# Patient Record
Sex: FEMALE | Race: WHITE | NOT HISPANIC OR LATINO
[De-identification: names, ages, dates, MRNs, and addresses within clinical notes are randomized per-mention and may not be internally consistent; named-entity substitution may affect disease eponyms.]

---

## 2020-09-14 ENCOUNTER — FORM ENCOUNTER (OUTPATIENT)
Age: 35
End: 2020-09-14

## 2020-10-07 ENCOUNTER — FORM ENCOUNTER (OUTPATIENT)
Age: 35
End: 2020-10-07

## 2020-10-11 ENCOUNTER — FORM ENCOUNTER (OUTPATIENT)
Age: 35
End: 2020-10-11

## 2020-10-12 ENCOUNTER — FORM ENCOUNTER (OUTPATIENT)
Age: 35
End: 2020-10-12

## 2020-10-12 ENCOUNTER — ASOB RESULT (OUTPATIENT)
Age: 35
End: 2020-10-12

## 2020-10-12 ENCOUNTER — OUTPATIENT (OUTPATIENT)
Dept: OUTPATIENT SERVICES | Facility: HOSPITAL | Age: 35
LOS: 1 days | Discharge: HOME | End: 2020-10-12

## 2020-10-12 ENCOUNTER — APPOINTMENT (OUTPATIENT)
Dept: ANTEPARTUM | Facility: CLINIC | Age: 35
End: 2020-10-12
Payer: COMMERCIAL

## 2020-10-12 DIAGNOSIS — Z11.59 ENCOUNTER FOR SCREENING FOR OTHER VIRAL DISEASES: ICD-10-CM

## 2020-10-12 PROCEDURE — 76817 TRANSVAGINAL US OBSTETRIC: CPT

## 2020-10-12 PROCEDURE — 76801 OB US < 14 WKS SINGLE FETUS: CPT

## 2020-10-13 ENCOUNTER — FORM ENCOUNTER (OUTPATIENT)
Age: 35
End: 2020-10-13

## 2020-10-14 ENCOUNTER — RESULT REVIEW (OUTPATIENT)
Age: 35
End: 2020-10-14

## 2020-10-14 ENCOUNTER — OUTPATIENT (OUTPATIENT)
Dept: OUTPATIENT SERVICES | Facility: HOSPITAL | Age: 35
LOS: 1 days | Discharge: HOME | End: 2020-10-14
Payer: COMMERCIAL

## 2020-10-14 VITALS
RESPIRATION RATE: 18 BRPM | DIASTOLIC BLOOD PRESSURE: 60 MMHG | HEIGHT: 66 IN | TEMPERATURE: 98 F | OXYGEN SATURATION: 100 % | SYSTOLIC BLOOD PRESSURE: 106 MMHG | WEIGHT: 147.05 LBS | HEART RATE: 79 BPM

## 2020-10-14 VITALS
RESPIRATION RATE: 24 BRPM | OXYGEN SATURATION: 99 % | SYSTOLIC BLOOD PRESSURE: 99 MMHG | DIASTOLIC BLOOD PRESSURE: 55 MMHG | HEART RATE: 68 BPM

## 2020-10-14 DIAGNOSIS — Z98.890 OTHER SPECIFIED POSTPROCEDURAL STATES: Chronic | ICD-10-CM

## 2020-10-14 PROCEDURE — 88305 TISSUE EXAM BY PATHOLOGIST: CPT | Mod: 26

## 2020-10-14 PROCEDURE — 59820 CARE OF MISCARRIAGE: CPT

## 2020-10-14 RX ORDER — OXYCODONE AND ACETAMINOPHEN 5; 325 MG/1; MG/1
1 TABLET ORAL EVERY 4 HOURS
Refills: 0 | Status: DISCONTINUED | OUTPATIENT
Start: 2020-10-14 | End: 2020-10-14

## 2020-10-14 RX ORDER — ONDANSETRON 8 MG/1
4 TABLET, FILM COATED ORAL ONCE
Refills: 0 | Status: DISCONTINUED | OUTPATIENT
Start: 2020-10-14 | End: 2020-10-28

## 2020-10-14 RX ORDER — SODIUM CHLORIDE 9 MG/ML
1000 INJECTION, SOLUTION INTRAVENOUS
Refills: 0 | Status: DISCONTINUED | OUTPATIENT
Start: 2020-10-14 | End: 2020-10-28

## 2020-10-14 RX ORDER — HYDROMORPHONE HYDROCHLORIDE 2 MG/ML
0.5 INJECTION INTRAMUSCULAR; INTRAVENOUS; SUBCUTANEOUS
Refills: 0 | Status: DISCONTINUED | OUTPATIENT
Start: 2020-10-14 | End: 2020-10-14

## 2020-10-14 RX ADMIN — SODIUM CHLORIDE 100 MILLILITER(S): 9 INJECTION, SOLUTION INTRAVENOUS at 14:44

## 2020-10-14 NOTE — CHART NOTE - NSCHARTNOTEFT_GEN_A_CORE
PACU ANESTHESIA ADMISSION NOTE      Procedure: Dilation and curettage of uterus using suction for missed  in first trimester      Post op diagnosis:  Missed         ____  Intubated  TV:______       Rate: ______      FiO2: ______    _x___  Patent Airway    _x___  Full return of protective reflexes    _x___  Full recovery from anesthesia / back to baseline status    Vitals  SPO2:-99% on 2l nc  HR:-78  RR:-12  B.P:-116/56  TEMP:-97.8    Mental Status:  _x___ Awake   ___x_ Alert   _____ Drowsy   _____ Sedated    Nausea/Vomiting:  _x___  NO       ______Yes,   See Post - Op Orders         Pain Scale (0-10):  __0___    Treatment: _x___ None    __x__ See Post - Op/PCA Orders    Post - Operative Fluids:   ___ Oral   ____x See Post - Op Orders    Plan: Discharge:   ___x_Home       _____Floor     _____Critical Care    _____  Other:_________________    Comments:  Report endorsed to RN in pacu  Vitals stable  No anesthesia issues or complications noted.  Discharge to patient to  home when criteria met.

## 2020-10-14 NOTE — H&P PST ADULT - ASSESSMENT
34yo  @10w2d here for D&C for MAB, measuring 6.5w by sono without a FHR.  - pre-op prep  - IV Fluids     aware

## 2020-10-14 NOTE — ASU DISCHARGE PLAN (ADULT/PEDIATRIC) - CARE PROVIDER_API CALL
Gallo Mendoza  OBSTETRICS AND GYNECOLOGY  [address] [place] [address]  Phone: [phone]  Fax: [phone]  Follow Up Time: 2 weeks

## 2020-10-14 NOTE — BRIEF OPERATIVE NOTE - OPERATION/FINDINGS
EUA revealed an anteverted 8 week size uterus with some vaginal bleeding. Moderate amounts of tissue obtained on curettage.

## 2020-10-14 NOTE — H&P PST ADULT - HISTORY OF PRESENT ILLNESS
36yo  @10w2d by LMP here for D&C for MAB. Patient was experiencing brown discharge last week @9w and she was measuring 6.5w. Sonogram was done to confirm findings 2 days ago and no fetal heart rate was found. Patient currently experiencing lower abdominal pain and lower back pain that goes on/off and is 6-7/10 in intensity. Currently having vaginal bleeding. Denies dizziness, lightheadedness, SOB, palpitations. Denies fever, chills, nausea, vomiting. Rh positive. Last Hgb 12.7 (19).

## 2020-10-14 NOTE — BRIEF OPERATIVE NOTE - NSICDXBRIEFPROCEDURE_GEN_ALL_CORE_FT
PROCEDURES:  Dilation and curettage of uterus using suction for missed  in first trimester 14-Oct-2020 14:32:54  Denisse Alvares

## 2020-10-15 LAB — SURGICAL PATHOLOGY STUDY: SIGNIFICANT CHANGE UP

## 2020-10-16 DIAGNOSIS — Z88.1 ALLERGY STATUS TO OTHER ANTIBIOTIC AGENTS STATUS: ICD-10-CM

## 2020-10-16 DIAGNOSIS — O02.1 MISSED ABORTION: ICD-10-CM

## 2020-10-16 DIAGNOSIS — Z88.6 ALLERGY STATUS TO ANALGESIC AGENT: ICD-10-CM

## 2020-10-16 DIAGNOSIS — K58.9 IRRITABLE BOWEL SYNDROME WITHOUT DIARRHEA: ICD-10-CM

## 2020-10-25 ENCOUNTER — FORM ENCOUNTER (OUTPATIENT)
Age: 35
End: 2020-10-25

## 2021-06-09 ENCOUNTER — FORM ENCOUNTER (OUTPATIENT)
Age: 36
End: 2021-06-09

## 2021-06-16 ENCOUNTER — FORM ENCOUNTER (OUTPATIENT)
Age: 36
End: 2021-06-16

## 2021-06-24 ENCOUNTER — FORM ENCOUNTER (OUTPATIENT)
Age: 36
End: 2021-06-24

## 2021-08-25 ENCOUNTER — FORM ENCOUNTER (OUTPATIENT)
Age: 36
End: 2021-08-25

## 2021-09-08 ENCOUNTER — FORM ENCOUNTER (OUTPATIENT)
Age: 36
End: 2021-09-08

## 2021-10-06 ENCOUNTER — FORM ENCOUNTER (OUTPATIENT)
Age: 36
End: 2021-10-06

## 2021-11-01 ENCOUNTER — FORM ENCOUNTER (OUTPATIENT)
Age: 36
End: 2021-11-01

## 2021-11-02 PROBLEM — K58.9 IRRITABLE BOWEL SYNDROME WITHOUT DIARRHEA: Chronic | Status: ACTIVE | Noted: 2020-10-14

## 2021-12-07 ENCOUNTER — FORM ENCOUNTER (OUTPATIENT)
Age: 36
End: 2021-12-07

## 2021-12-08 ENCOUNTER — ASOB RESULT (OUTPATIENT)
Age: 36
End: 2021-12-08

## 2021-12-08 ENCOUNTER — APPOINTMENT (OUTPATIENT)
Dept: ANTEPARTUM | Facility: CLINIC | Age: 36
End: 2021-12-08
Payer: COMMERCIAL

## 2021-12-08 PROCEDURE — 76811 OB US DETAILED SNGL FETUS: CPT

## 2021-12-13 ENCOUNTER — OUTPATIENT (OUTPATIENT)
Dept: OUTPATIENT SERVICES | Facility: HOSPITAL | Age: 36
LOS: 1 days | End: 2021-12-13
Payer: COMMERCIAL

## 2021-12-13 DIAGNOSIS — Z20.828 CONTACT WITH AND (SUSPECTED) EXPOSURE TO OTHER VIRAL COMMUNICABLE DISEASES: ICD-10-CM

## 2021-12-13 DIAGNOSIS — Z98.890 OTHER SPECIFIED POSTPROCEDURAL STATES: Chronic | ICD-10-CM

## 2021-12-13 LAB — SARS-COV-2 RNA SPEC QL NAA+PROBE: SIGNIFICANT CHANGE UP

## 2021-12-13 PROCEDURE — 87635 SARS-COV-2 COVID-19 AMP PRB: CPT

## 2022-01-03 ENCOUNTER — FORM ENCOUNTER (OUTPATIENT)
Age: 37
End: 2022-01-03

## 2022-01-10 ENCOUNTER — FORM ENCOUNTER (OUTPATIENT)
Age: 37
End: 2022-01-10

## 2022-01-12 ENCOUNTER — OUTPATIENT (OUTPATIENT)
Dept: OUTPATIENT SERVICES | Facility: HOSPITAL | Age: 37
LOS: 1 days | Discharge: HOME | End: 2022-01-12

## 2022-01-12 VITALS
SYSTOLIC BLOOD PRESSURE: 107 MMHG | RESPIRATION RATE: 18 BRPM | TEMPERATURE: 98 F | DIASTOLIC BLOOD PRESSURE: 60 MMHG | HEART RATE: 87 BPM

## 2022-01-12 VITALS — SYSTOLIC BLOOD PRESSURE: 116 MMHG | DIASTOLIC BLOOD PRESSURE: 63 MMHG | HEART RATE: 83 BPM

## 2022-01-12 DIAGNOSIS — Z98.890 OTHER SPECIFIED POSTPROCEDURAL STATES: Chronic | ICD-10-CM

## 2022-01-12 LAB
APTT BLD: 30 SEC — SIGNIFICANT CHANGE UP (ref 27–39.2)
APTT BLD: 30.2 SEC — SIGNIFICANT CHANGE UP (ref 27–39.2)
BASOPHILS # BLD AUTO: 0.02 K/UL — SIGNIFICANT CHANGE UP (ref 0–0.2)
BASOPHILS # BLD AUTO: 0.03 K/UL — SIGNIFICANT CHANGE UP (ref 0–0.2)
BASOPHILS NFR BLD AUTO: 0.2 % — SIGNIFICANT CHANGE UP (ref 0–1)
BASOPHILS NFR BLD AUTO: 0.3 % — SIGNIFICANT CHANGE UP (ref 0–1)
BLD GP AB SCN SERPL QL: SIGNIFICANT CHANGE UP
EOSINOPHIL # BLD AUTO: 0.11 K/UL — SIGNIFICANT CHANGE UP (ref 0–0.7)
EOSINOPHIL # BLD AUTO: 0.2 K/UL — SIGNIFICANT CHANGE UP (ref 0–0.7)
EOSINOPHIL NFR BLD AUTO: 1.1 % — SIGNIFICANT CHANGE UP (ref 0–8)
EOSINOPHIL NFR BLD AUTO: 2.3 % — SIGNIFICANT CHANGE UP (ref 0–8)
FIBRINOGEN PPP-MCNC: 445 MG/DL — SIGNIFICANT CHANGE UP (ref 204.4–570.6)
FIBRINOGEN PPP-MCNC: 491 MG/DL — SIGNIFICANT CHANGE UP (ref 204.4–570.6)
HCT VFR BLD CALC: 33.1 % — LOW (ref 37–47)
HCT VFR BLD CALC: 35.5 % — LOW (ref 37–47)
HGB BLD-MCNC: 11.5 G/DL — LOW (ref 12–16)
HGB BLD-MCNC: 12.2 G/DL — SIGNIFICANT CHANGE UP (ref 12–16)
IMM GRANULOCYTES NFR BLD AUTO: 1.2 % — HIGH (ref 0.1–0.3)
IMM GRANULOCYTES NFR BLD AUTO: 1.2 % — HIGH (ref 0.1–0.3)
INR BLD: 0.95 RATIO — SIGNIFICANT CHANGE UP (ref 0.65–1.3)
INR BLD: 0.98 RATIO — SIGNIFICANT CHANGE UP (ref 0.65–1.3)
LYMPHOCYTES # BLD AUTO: 1.38 K/UL — SIGNIFICANT CHANGE UP (ref 1.2–3.4)
LYMPHOCYTES # BLD AUTO: 1.86 K/UL — SIGNIFICANT CHANGE UP (ref 1.2–3.4)
LYMPHOCYTES # BLD AUTO: 14.2 % — LOW (ref 20.5–51.1)
LYMPHOCYTES # BLD AUTO: 21.5 % — SIGNIFICANT CHANGE UP (ref 20.5–51.1)
MCHC RBC-ENTMCNC: 32.8 PG — HIGH (ref 27–31)
MCHC RBC-ENTMCNC: 33.2 PG — HIGH (ref 27–31)
MCHC RBC-ENTMCNC: 34.4 G/DL — SIGNIFICANT CHANGE UP (ref 32–37)
MCHC RBC-ENTMCNC: 34.7 G/DL — SIGNIFICANT CHANGE UP (ref 32–37)
MCV RBC AUTO: 95.4 FL — SIGNIFICANT CHANGE UP (ref 81–99)
MCV RBC AUTO: 95.7 FL — SIGNIFICANT CHANGE UP (ref 81–99)
MONOCYTES # BLD AUTO: 0.8 K/UL — HIGH (ref 0.1–0.6)
MONOCYTES # BLD AUTO: 0.81 K/UL — HIGH (ref 0.1–0.6)
MONOCYTES NFR BLD AUTO: 8.2 % — SIGNIFICANT CHANGE UP (ref 1.7–9.3)
MONOCYTES NFR BLD AUTO: 9.3 % — SIGNIFICANT CHANGE UP (ref 1.7–9.3)
NEUTROPHILS # BLD AUTO: 5.67 K/UL — SIGNIFICANT CHANGE UP (ref 1.4–6.5)
NEUTROPHILS # BLD AUTO: 7.29 K/UL — HIGH (ref 1.4–6.5)
NEUTROPHILS NFR BLD AUTO: 65.4 % — SIGNIFICANT CHANGE UP (ref 42.2–75.2)
NEUTROPHILS NFR BLD AUTO: 75.1 % — SIGNIFICANT CHANGE UP (ref 42.2–75.2)
NRBC # BLD: 0 /100 WBCS — SIGNIFICANT CHANGE UP (ref 0–0)
NRBC # BLD: 0 /100 WBCS — SIGNIFICANT CHANGE UP (ref 0–0)
PLATELET # BLD AUTO: 244 K/UL — SIGNIFICANT CHANGE UP (ref 130–400)
PLATELET # BLD AUTO: 272 K/UL — SIGNIFICANT CHANGE UP (ref 130–400)
PROTHROM AB SERPL-ACNC: 10.9 SEC — SIGNIFICANT CHANGE UP (ref 9.95–12.87)
PROTHROM AB SERPL-ACNC: 11.3 SEC — SIGNIFICANT CHANGE UP (ref 9.95–12.87)
RBC # BLD: 3.46 M/UL — LOW (ref 4.2–5.4)
RBC # BLD: 3.72 M/UL — LOW (ref 4.2–5.4)
RBC # FLD: 13.2 % — SIGNIFICANT CHANGE UP (ref 11.5–14.5)
RBC # FLD: 13.2 % — SIGNIFICANT CHANGE UP (ref 11.5–14.5)
SARS-COV-2 RNA SPEC QL NAA+PROBE: DETECTED
WBC # BLD: 8.67 K/UL — SIGNIFICANT CHANGE UP (ref 4.8–10.8)
WBC # BLD: 9.72 K/UL — SIGNIFICANT CHANGE UP (ref 4.8–10.8)
WBC # FLD AUTO: 8.67 K/UL — SIGNIFICANT CHANGE UP (ref 4.8–10.8)
WBC # FLD AUTO: 9.72 K/UL — SIGNIFICANT CHANGE UP (ref 4.8–10.8)

## 2022-01-12 RX ORDER — SODIUM CHLORIDE 9 MG/ML
1000 INJECTION, SOLUTION INTRAVENOUS
Refills: 0 | Status: DISCONTINUED | OUTPATIENT
Start: 2022-01-12 | End: 2022-01-26

## 2022-01-12 RX ADMIN — SODIUM CHLORIDE 125 MILLILITER(S): 9 INJECTION, SOLUTION INTRAVENOUS at 17:35

## 2022-01-12 NOTE — OB RN TRIAGE NOTE - FALL HARM RISK - UNIVERSAL INTERVENTIONS
Bed in lowest position, wheels locked, appropriate side rails in place/Call bell, personal items and telephone in reach/Instruct patient to call for assistance before getting out of bed or chair/Non-slip footwear when patient is out of bed/[place] to call system/Physically safe environment - no spills, clutter or unnecessary equipment/Purposeful Proactive Rounding/Room/bathroom lighting operational, light cord in reach

## 2022-01-12 NOTE — OB PROVIDER TRIAGE NOTE - ADDITIONAL INSTRUCTIONS
Please return to hospital if you experience decreased fetal movement, heavy vaginal bleeding, frequent and persistent contractions, or leakage of fluid. Please keep your appointment with your doctor.

## 2022-01-12 NOTE — OB PROVIDER TRIAGE NOTE - HISTORY OF PRESENT ILLNESS
35yo  at 25w6d GA, EDC 22 by LMP c/w 1st trimester sonogram presenting to L&D for vaginal bleeding starting this morning at 730AM. Patient states she woke up and noticed about a quarter size ring of blood on her underwear. Patient states the bleeding has now stopped. Patient denies recent intercourse, abdominal trauma or vaginal trauma. Pregnancy complicated early on by placenta previa that resolved and is now low lying. Patient denies abdominal pain, contractions, LOF. Good fetal movement. No other complications this pregnancy. GBS unknown    Last BM this morning  Last sex 2 weeks ago  Last PO intake 1030 this morning

## 2022-01-12 NOTE — OB PROVIDER TRIAGE NOTE - NSHPPHYSICALEXAM_GEN_ALL_CORE
T(C): 36.7 (01-12-22 @ 11:35), Max: 36.7 (01-12-22 @ 11:35)  HR: 83 (01-12-22 @ 11:35) (83 - 83)  BP: 116/63 (01-12-22 @ 11:35) (116/63 - 116/63)  RR: 18 (01-12-22 @ 11:35) (18 - 18)    Gen: A+OX3. NAD  Heart: RRR, no M/R/G  Lungs: CTAB  Abd: Soft, Nontender. Gravid. no RUQ or epigastric pain  LE: no erythema, edema or pain    FHR: 150bpm/moderate variability/+accelerations/no decelerations  TOCO: none  SVE: deferred  speculum: cervix visually closed, no vaginal bleeding noted, no abnormal discharge  TVUS: cvx 4.28cm, placenta 0.95cm away from cervical os  BSS: breech, posterior placenta, MVP: 5.65cm, BPP: 10/10    EFW by [place]: 1000gms T(C): 36.7 (01-12-22 @ 11:35), Max: 36.7 (01-12-22 @ 11:35)  HR: 83 (01-12-22 @ 11:35) (83 - 83)  BP: 116/63 (01-12-22 @ 11:35) (116/63 - 116/63)  RR: 18 (01-12-22 @ 11:35) (18 - 18)    Gen: A+OX3. NAD  Heart: RRR, no M/R/G  Lungs: CTAB  Abd: Soft, Nontender. Gravid. no RUQ or epigastric pain  LE: no erythema, edema or pain    FHR: 150bpm/moderate variability/+accelerations/no decelerations  TOCO: none  SVE: deferred  speculum: cervix visually closed, no vaginal bleeding noted, no abnormal discharge  TVUS: cvx 4.28cm, placenta 0.95cm away from cervical os  BSS: breech, posterior placenta, MVP: 5.65cm, BPP: 10/10, 2lbs 8oz (79%)

## 2022-01-12 NOTE — OB PROVIDER TRIAGE NOTE - NS_OBGYNHISTORY_OBGYN_ALL_OB_FT
OB Hx:     FT NSVDx4 largest 8lbs  SAB w/ D+C    Gyn Hx: denies abnormal papsmears, fibroids, cysts, STDs

## 2022-01-12 NOTE — OB PROVIDER TRIAGE NOTE - NSOBPROVIDERNOTE_OBGYN_ALL_OB_FT
37yo  at 25w6d GA, GBS unknown, with vaginal bleeding now resolved, reassuring maternal and fetal status, for prolonged monitoring  -continuous eFM and toco  -vitals and labs  -pad counts  -f/u covid swab    Dr. Mendoza and Dr. Cheek to be made aware 35yo  at 25w6d GA, GBS unknown, with vaginal bleeding now resolved, reassuring maternal and fetal status, for prolonged monitoring  -continuous eFM and toco  -vitals and labs  -pad counts  -f/u covid swab    Dr. Mendoza and Dr. Cheek to be made aware    Append:   35yo  at 25w6d GA, Rh positive, GBS unknown, with vaginal bleeding now resolved, reassuring maternal and fetal status  -repeat labs after monitoring are stable  - labor precautions given  -fetal kick count instructions counseled  -PO maternal hydration encouraged  -follow up in next scheduled office appointemt    Dr Tilley and Dr Mendoza aware

## 2022-02-06 ENCOUNTER — FORM ENCOUNTER (OUTPATIENT)
Age: 37
End: 2022-02-06

## 2022-02-07 ENCOUNTER — ASOB RESULT (OUTPATIENT)
Age: 37
End: 2022-02-07

## 2022-02-07 ENCOUNTER — APPOINTMENT (OUTPATIENT)
Dept: ANTEPARTUM | Facility: CLINIC | Age: 37
End: 2022-02-07
Payer: COMMERCIAL

## 2022-02-07 PROCEDURE — 76816 OB US FOLLOW-UP PER FETUS: CPT

## 2022-02-07 PROCEDURE — 76817 TRANSVAGINAL US OBSTETRIC: CPT

## 2022-02-22 ENCOUNTER — FORM ENCOUNTER (OUTPATIENT)
Age: 37
End: 2022-02-22

## 2022-02-27 ENCOUNTER — FORM ENCOUNTER (OUTPATIENT)
Age: 37
End: 2022-02-27

## 2022-03-20 ENCOUNTER — FORM ENCOUNTER (OUTPATIENT)
Age: 37
End: 2022-03-20

## 2022-03-21 ENCOUNTER — ASOB RESULT (OUTPATIENT)
Age: 37
End: 2022-03-21

## 2022-03-21 ENCOUNTER — APPOINTMENT (OUTPATIENT)
Dept: ANTEPARTUM | Facility: CLINIC | Age: 37
End: 2022-03-21
Payer: COMMERCIAL

## 2022-03-21 PROCEDURE — 76819 FETAL BIOPHYS PROFIL W/O NST: CPT

## 2022-03-21 PROCEDURE — 76817 TRANSVAGINAL US OBSTETRIC: CPT

## 2022-03-21 PROCEDURE — 76816 OB US FOLLOW-UP PER FETUS: CPT

## 2022-03-23 ENCOUNTER — APPOINTMENT (OUTPATIENT)
Dept: ANTEPARTUM | Facility: CLINIC | Age: 37
End: 2022-03-23
Payer: COMMERCIAL

## 2022-03-23 ENCOUNTER — ASOB RESULT (OUTPATIENT)
Age: 37
End: 2022-03-23

## 2022-03-23 PROCEDURE — 76817 TRANSVAGINAL US OBSTETRIC: CPT

## 2022-03-23 PROCEDURE — 76819 FETAL BIOPHYS PROFIL W/O NST: CPT

## 2022-03-27 ENCOUNTER — FORM ENCOUNTER (OUTPATIENT)
Age: 37
End: 2022-03-27

## 2022-04-04 ENCOUNTER — APPOINTMENT (OUTPATIENT)
Dept: ANTEPARTUM | Facility: CLINIC | Age: 37
End: 2022-04-04

## 2022-04-04 ENCOUNTER — FORM ENCOUNTER (OUTPATIENT)
Age: 37
End: 2022-04-04

## 2022-04-10 ENCOUNTER — FORM ENCOUNTER (OUTPATIENT)
Age: 37
End: 2022-04-10

## 2022-04-17 ENCOUNTER — FORM ENCOUNTER (OUTPATIENT)
Age: 37
End: 2022-04-17

## 2022-04-19 ENCOUNTER — INPATIENT (INPATIENT)
Facility: HOSPITAL | Age: 37
LOS: 1 days | Discharge: HOME | End: 2022-04-21
Attending: OBSTETRICS & GYNECOLOGY | Admitting: OBSTETRICS & GYNECOLOGY
Payer: COMMERCIAL

## 2022-04-19 VITALS
HEIGHT: 66 IN | RESPIRATION RATE: 16 BRPM | HEART RATE: 67 BPM | WEIGHT: 158.07 LBS | SYSTOLIC BLOOD PRESSURE: 112 MMHG | TEMPERATURE: 99 F | DIASTOLIC BLOOD PRESSURE: 67 MMHG

## 2022-04-19 DIAGNOSIS — Z98.890 OTHER SPECIFIED POSTPROCEDURAL STATES: Chronic | ICD-10-CM

## 2022-04-19 LAB
AMPHET UR-MCNC: NEGATIVE — SIGNIFICANT CHANGE UP
APPEARANCE UR: ABNORMAL
BACTERIA # UR AUTO: ABNORMAL
BARBITURATES UR SCN-MCNC: NEGATIVE — SIGNIFICANT CHANGE UP
BASOPHILS # BLD AUTO: 0.02 K/UL — SIGNIFICANT CHANGE UP (ref 0–0.2)
BASOPHILS NFR BLD AUTO: 0.2 % — SIGNIFICANT CHANGE UP (ref 0–1)
BENZODIAZ UR-MCNC: NEGATIVE — SIGNIFICANT CHANGE UP
BILIRUB UR-MCNC: ABNORMAL
BLD GP AB SCN SERPL QL: SIGNIFICANT CHANGE UP
BUPRENORPHINE SCREEN, URINE RESULT: NEGATIVE — SIGNIFICANT CHANGE UP
COCAINE METAB.OTHER UR-MCNC: NEGATIVE — SIGNIFICANT CHANGE UP
COLOR SPEC: YELLOW — SIGNIFICANT CHANGE UP
DIFF PNL FLD: ABNORMAL
EOSINOPHIL # BLD AUTO: 0.02 K/UL — SIGNIFICANT CHANGE UP (ref 0–0.7)
EOSINOPHIL NFR BLD AUTO: 0.2 % — SIGNIFICANT CHANGE UP (ref 0–8)
EPI CELLS # UR: 3 /HPF — SIGNIFICANT CHANGE UP (ref 0–5)
FENTANYL UR QL: NEGATIVE — SIGNIFICANT CHANGE UP
GLUCOSE UR QL: NEGATIVE — SIGNIFICANT CHANGE UP
HCT VFR BLD CALC: 41.2 % — SIGNIFICANT CHANGE UP (ref 37–47)
HGB BLD-MCNC: 14.3 G/DL — SIGNIFICANT CHANGE UP (ref 12–16)
HYALINE CASTS # UR AUTO: 22 /LPF — HIGH (ref 0–7)
IMM GRANULOCYTES NFR BLD AUTO: 0.6 % — HIGH (ref 0.1–0.3)
KETONES UR-MCNC: ABNORMAL
L&D DRUG SCREEN, URINE: SIGNIFICANT CHANGE UP
LEUKOCYTE ESTERASE UR-ACNC: ABNORMAL
LYMPHOCYTES # BLD AUTO: 1.2 K/UL — SIGNIFICANT CHANGE UP (ref 1.2–3.4)
LYMPHOCYTES # BLD AUTO: 11.7 % — LOW (ref 20.5–51.1)
MCHC RBC-ENTMCNC: 32.8 PG — HIGH (ref 27–31)
MCHC RBC-ENTMCNC: 34.7 G/DL — SIGNIFICANT CHANGE UP (ref 32–37)
MCV RBC AUTO: 94.5 FL — SIGNIFICANT CHANGE UP (ref 81–99)
METHADONE UR-MCNC: NEGATIVE — SIGNIFICANT CHANGE UP
MONOCYTES # BLD AUTO: 0.72 K/UL — HIGH (ref 0.1–0.6)
MONOCYTES NFR BLD AUTO: 7 % — SIGNIFICANT CHANGE UP (ref 1.7–9.3)
NEUTROPHILS # BLD AUTO: 8.21 K/UL — HIGH (ref 1.4–6.5)
NEUTROPHILS NFR BLD AUTO: 80.3 % — HIGH (ref 42.2–75.2)
NITRITE UR-MCNC: NEGATIVE — SIGNIFICANT CHANGE UP
NRBC # BLD: 0 /100 WBCS — SIGNIFICANT CHANGE UP (ref 0–0)
OPIATES UR-MCNC: NEGATIVE — SIGNIFICANT CHANGE UP
OXYCODONE UR-MCNC: NEGATIVE — SIGNIFICANT CHANGE UP
PCP UR-MCNC: NEGATIVE — SIGNIFICANT CHANGE UP
PH UR: 7.5 — SIGNIFICANT CHANGE UP (ref 5–8)
PLATELET # BLD AUTO: 210 K/UL — SIGNIFICANT CHANGE UP (ref 130–400)
PRENATAL SYPHILIS TEST: SIGNIFICANT CHANGE UP
PROPOXYPHENE QUALITATIVE URINE RESULT: NEGATIVE — SIGNIFICANT CHANGE UP
PROT UR-MCNC: ABNORMAL
RBC # BLD: 4.36 M/UL — SIGNIFICANT CHANGE UP (ref 4.2–5.4)
RBC # FLD: 13.8 % — SIGNIFICANT CHANGE UP (ref 11.5–14.5)
RBC CASTS # UR COMP ASSIST: 7 /HPF — HIGH (ref 0–4)
SARS-COV-2 RNA SPEC QL NAA+PROBE: SIGNIFICANT CHANGE UP
SP GR SPEC: 1.02 — SIGNIFICANT CHANGE UP (ref 1.01–1.03)
UROBILINOGEN FLD QL: ABNORMAL
WBC # BLD: 10.23 K/UL — SIGNIFICANT CHANGE UP (ref 4.8–10.8)
WBC # FLD AUTO: 10.23 K/UL — SIGNIFICANT CHANGE UP (ref 4.8–10.8)
WBC UR QL: 117 /HPF — HIGH (ref 0–5)

## 2022-04-19 PROCEDURE — 59400 OBSTETRICAL CARE: CPT | Mod: U9

## 2022-04-19 RX ORDER — OXYCODONE HYDROCHLORIDE 5 MG/1
5 TABLET ORAL
Refills: 0 | Status: DISCONTINUED | OUTPATIENT
Start: 2022-04-19 | End: 2022-04-21

## 2022-04-19 RX ORDER — DIBUCAINE 1 %
1 OINTMENT (GRAM) RECTAL EVERY 6 HOURS
Refills: 0 | Status: DISCONTINUED | OUTPATIENT
Start: 2022-04-19 | End: 2022-04-21

## 2022-04-19 RX ORDER — LANOLIN
1 OINTMENT (GRAM) TOPICAL EVERY 6 HOURS
Refills: 0 | Status: DISCONTINUED | OUTPATIENT
Start: 2022-04-19 | End: 2022-04-21

## 2022-04-19 RX ORDER — OXYTOCIN 10 UNIT/ML
10 VIAL (ML) INJECTION ONCE
Refills: 0 | Status: COMPLETED | OUTPATIENT
Start: 2022-04-19 | End: 2022-04-19

## 2022-04-19 RX ORDER — KETOROLAC TROMETHAMINE 30 MG/ML
30 SYRINGE (ML) INJECTION ONCE
Refills: 0 | Status: DISCONTINUED | OUTPATIENT
Start: 2022-04-19 | End: 2022-04-21

## 2022-04-19 RX ORDER — SIMETHICONE 80 MG/1
80 TABLET, CHEWABLE ORAL EVERY 4 HOURS
Refills: 0 | Status: DISCONTINUED | OUTPATIENT
Start: 2022-04-19 | End: 2022-04-21

## 2022-04-19 RX ORDER — OXYTOCIN 10 UNIT/ML
333.33 VIAL (ML) INJECTION
Qty: 20 | Refills: 0 | Status: DISCONTINUED | OUTPATIENT
Start: 2022-04-19 | End: 2022-04-21

## 2022-04-19 RX ORDER — BENZOCAINE 10 %
1 GEL (GRAM) MUCOUS MEMBRANE EVERY 6 HOURS
Refills: 0 | Status: DISCONTINUED | OUTPATIENT
Start: 2022-04-19 | End: 2022-04-21

## 2022-04-19 RX ORDER — DIPHENHYDRAMINE HCL 50 MG
25 CAPSULE ORAL EVERY 6 HOURS
Refills: 0 | Status: DISCONTINUED | OUTPATIENT
Start: 2022-04-19 | End: 2022-04-21

## 2022-04-19 RX ORDER — TETANUS TOXOID, REDUCED DIPHTHERIA TOXOID AND ACELLULAR PERTUSSIS VACCINE, ADSORBED 5; 2.5; 8; 8; 2.5 [IU]/.5ML; [IU]/.5ML; UG/.5ML; UG/.5ML; UG/.5ML
0.5 SUSPENSION INTRAMUSCULAR ONCE
Refills: 0 | Status: DISCONTINUED | OUTPATIENT
Start: 2022-04-19 | End: 2022-04-21

## 2022-04-19 RX ORDER — AER TRAVELER 0.5 G/1
1 SOLUTION RECTAL; TOPICAL EVERY 4 HOURS
Refills: 0 | Status: DISCONTINUED | OUTPATIENT
Start: 2022-04-19 | End: 2022-04-21

## 2022-04-19 RX ORDER — ACETAMINOPHEN 500 MG
975 TABLET ORAL
Refills: 0 | Status: DISCONTINUED | OUTPATIENT
Start: 2022-04-19 | End: 2022-04-21

## 2022-04-19 RX ORDER — SODIUM CHLORIDE 9 MG/ML
1000 INJECTION, SOLUTION INTRAVENOUS
Refills: 0 | Status: DISCONTINUED | OUTPATIENT
Start: 2022-04-19 | End: 2022-04-19

## 2022-04-19 RX ORDER — MAGNESIUM HYDROXIDE 400 MG/1
30 TABLET, CHEWABLE ORAL
Refills: 0 | Status: DISCONTINUED | OUTPATIENT
Start: 2022-04-19 | End: 2022-04-21

## 2022-04-19 RX ORDER — IBUPROFEN 200 MG
600 TABLET ORAL EVERY 6 HOURS
Refills: 0 | Status: DISCONTINUED | OUTPATIENT
Start: 2022-04-19 | End: 2022-04-21

## 2022-04-19 RX ORDER — OXYCODONE HYDROCHLORIDE 5 MG/1
5 TABLET ORAL ONCE
Refills: 0 | Status: DISCONTINUED | OUTPATIENT
Start: 2022-04-19 | End: 2022-04-21

## 2022-04-19 RX ORDER — SODIUM CHLORIDE 9 MG/ML
3 INJECTION INTRAMUSCULAR; INTRAVENOUS; SUBCUTANEOUS EVERY 8 HOURS
Refills: 0 | Status: DISCONTINUED | OUTPATIENT
Start: 2022-04-19 | End: 2022-04-21

## 2022-04-19 RX ORDER — HYDROCORTISONE 1 %
1 OINTMENT (GRAM) TOPICAL EVERY 6 HOURS
Refills: 0 | Status: DISCONTINUED | OUTPATIENT
Start: 2022-04-19 | End: 2022-04-21

## 2022-04-19 RX ORDER — IBUPROFEN 200 MG
600 TABLET ORAL EVERY 6 HOURS
Refills: 0 | Status: COMPLETED | OUTPATIENT
Start: 2022-04-19 | End: 2023-03-18

## 2022-04-19 RX ORDER — PRAMOXINE HYDROCHLORIDE 150 MG/15G
1 AEROSOL, FOAM RECTAL EVERY 4 HOURS
Refills: 0 | Status: DISCONTINUED | OUTPATIENT
Start: 2022-04-19 | End: 2022-04-21

## 2022-04-19 RX ADMIN — Medication 10 UNIT(S): at 14:06

## 2022-04-19 RX ADMIN — Medication 975 MILLIGRAM(S): at 21:07

## 2022-04-19 RX ADMIN — Medication 1000 MILLIUNIT(S)/MIN: at 14:06

## 2022-04-19 RX ADMIN — SODIUM CHLORIDE 3 MILLILITER(S): 9 INJECTION INTRAMUSCULAR; INTRAVENOUS; SUBCUTANEOUS at 14:00

## 2022-04-19 RX ADMIN — Medication 975 MILLIGRAM(S): at 13:36

## 2022-04-19 RX ADMIN — Medication 975 MILLIGRAM(S): at 14:00

## 2022-04-19 RX ADMIN — SODIUM CHLORIDE 3 MILLILITER(S): 9 INJECTION INTRAMUSCULAR; INTRAVENOUS; SUBCUTANEOUS at 21:14

## 2022-04-19 NOTE — OB PROVIDER H&P - NSHPLABSRESULTS_GEN_ALL_CORE
GCT: 97  Parvo: immune  Mumps: immune  Varicella: immune  Lead: neg    Sonos:  35w6d: posterior low-lying placenta (2.3cm from internal os), BPP 8/8, vertex  35w4d: MVP 5.65cm, BPP 8/8, posterior low-lying placenta, 3303g (95%)  29w4d: 1749g (92%), BPP 8/8, MVP 6.58cm, posterior with marginal previa  20w6d: normal anatomy, posterior partial previa, 413g

## 2022-04-19 NOTE — OB PROVIDER H&P - ASSESSMENT
37yo  @39w5d, GBS neg, h/o partial placenta previa, now low-lying, in labor.    -Admit to L&D  -Admission labs  -Monitor vitals  -Cont EFM/[place]  -Pain management prn  -Clear liquid diet    Dr. Arreguin and Dr. Olson aware

## 2022-04-19 NOTE — OB RN DELIVERY SUMMARY - NSSELHIDDEN_OBGYN_ALL_OB_FT
[NS_DeliveryAttending1_OBGYN_ALL_OB_FT:[id number]=],[NS_DeliveryRN_OBGYN_ALL_OB_FT:[email or web address]=]

## 2022-04-19 NOTE — OB PROVIDER DELIVERY SUMMARY - NSPROVIDERDELIVERYNOTE_OBGYN_ALL_OB_FT
Patient found ot be FD/0 and pushign effectively to delivery of healhty infant in OP position, APGAR 9/9. Infatn cried sponteanously and moved all four limbs. NC x 1. Delayed cord clamping performed. Placenta delivered intact, 3VC noted. EBL 200cc. Cervix/vagina/perineum intact.

## 2022-04-19 NOTE — OB PROVIDER DELIVERY SUMMARY - NSSELHIDDEN_OBGYN_ALL_OB_FT
[NS_DeliveryAttending1_OBGYN_ALL_OB_FT:[id number]=],[NS_DeliveryRN_OBGYN_ALL_OB_FT:[id number]=]

## 2022-04-19 NOTE — OB PROVIDER H&P - HISTORY OF PRESENT ILLNESS
35yo  @39w5d, DORIS: 22 by LMP c/w first trim sono, presents to L&D with complaint of contractions and leakage of fluid. Pain is 7/10, q3-5m. Leaking began at 1100. Denies vaginal bleeding. Good fetal movement. Pregnancy complicated by partial placenta previa at 20w6d. Placenta edge was noted to be 2.3cm from internal os on 3/23. Cleared for vaginal delivery. Denies other complications in this pregnancy. GBS neg.

## 2022-04-19 NOTE — OB PROVIDER H&P - NS_FETALMONCTXRES_OBGYN_ALL_OB
Continued growth and development Continued ad mora feedings, follow up with pediatrician in 1-2 days following discharge. Relaxed Continued ad mora feedings, follow up with pediatrician in 1-2 days following discharge. Always place infant on back when sleeping.

## 2022-04-19 NOTE — OB PROVIDER H&P - NSHPPHYSICALEXAM_GEN_ALL_CORE
Vital Signs Last 24 Hrs  T(C): 37 (19 Apr 2022 11:11), Max: 37 (19 Apr 2022 11:11)  T(F): 98.6 (19 Apr 2022 11:11), Max: 98.6 (19 Apr 2022 11:11)  HR: 67 (19 Apr 2022 11:12) (67 - 67)  BP: 112/67 (19 Apr 2022 11:12) (112/67 - 112/67)  RR: 16 (19 Apr 2022 11:11) (16 - 16)    Gen: AOx3, NAD  EFM: 140/mod  [place]: q3m  SVE: 5/70/-1, vertex, ruptured  Abd: soft, gravid, nontender, palpable ctx

## 2022-04-19 NOTE — OB RN PATIENT PROFILE - NS_OBGYNHISTORY_OBGYN_ALL_OB_FT
OBHx:  FT  x4, 8-8  SAB x1, D&C x1    GynHx: denies h/o ovarian cysts, fibroids, abnormal pap smears, STIs

## 2022-04-20 ENCOUNTER — FORM ENCOUNTER (OUTPATIENT)
Age: 37
End: 2022-04-20

## 2022-04-20 LAB
COVID-19 SPIKE DOMAIN AB INTERP: POSITIVE
COVID-19 SPIKE DOMAIN ANTIBODY RESULT: >250 U/ML — HIGH
HCT VFR BLD CALC: 37.1 % — SIGNIFICANT CHANGE UP (ref 37–47)
HGB BLD-MCNC: 12.5 G/DL — SIGNIFICANT CHANGE UP (ref 12–16)
MCHC RBC-ENTMCNC: 32.4 PG — HIGH (ref 27–31)
MCHC RBC-ENTMCNC: 33.7 G/DL — SIGNIFICANT CHANGE UP (ref 32–37)
MCV RBC AUTO: 96.1 FL — SIGNIFICANT CHANGE UP (ref 81–99)
NRBC # BLD: 0 /100 WBCS — SIGNIFICANT CHANGE UP (ref 0–0)
PLATELET # BLD AUTO: 196 K/UL — SIGNIFICANT CHANGE UP (ref 130–400)
RBC # BLD: 3.86 M/UL — LOW (ref 4.2–5.4)
RBC # FLD: 14.1 % — SIGNIFICANT CHANGE UP (ref 11.5–14.5)
SARS-COV-2 IGG+IGM SERPL QL IA: >250 U/ML — HIGH
SARS-COV-2 IGG+IGM SERPL QL IA: POSITIVE
WBC # BLD: 10.65 K/UL — SIGNIFICANT CHANGE UP (ref 4.8–10.8)
WBC # FLD AUTO: 10.65 K/UL — SIGNIFICANT CHANGE UP (ref 4.8–10.8)

## 2022-04-20 RX ADMIN — Medication 1 TABLET(S): at 14:09

## 2022-04-20 RX ADMIN — SODIUM CHLORIDE 3 MILLILITER(S): 9 INJECTION INTRAMUSCULAR; INTRAVENOUS; SUBCUTANEOUS at 14:09

## 2022-04-20 RX ADMIN — Medication 975 MILLIGRAM(S): at 22:43

## 2022-04-20 RX ADMIN — SODIUM CHLORIDE 3 MILLILITER(S): 9 INJECTION INTRAMUSCULAR; INTRAVENOUS; SUBCUTANEOUS at 06:27

## 2022-04-20 RX ADMIN — Medication 975 MILLIGRAM(S): at 06:24

## 2022-04-21 ENCOUNTER — TRANSCRIPTION ENCOUNTER (OUTPATIENT)
Age: 37
End: 2022-04-21

## 2022-04-21 VITALS — DIASTOLIC BLOOD PRESSURE: 59 MMHG | TEMPERATURE: 97 F | SYSTOLIC BLOOD PRESSURE: 109 MMHG | HEART RATE: 77 BPM

## 2022-04-21 RX ORDER — PANTOPRAZOLE SODIUM 20 MG/1
0 TABLET, DELAYED RELEASE ORAL
Qty: 0 | Refills: 0 | DISCHARGE

## 2022-04-21 RX ORDER — IBUPROFEN 200 MG
1 TABLET ORAL
Qty: 0 | Refills: 0 | DISCHARGE
Start: 2022-04-21

## 2022-04-21 NOTE — DISCHARGE NOTE OB - CARE PROVIDER_API CALL
Gallo Mendoza)  Obstetrics and Gynecology  [address] [place] [address]  Phone: [phone]  Fax: [phone]  Follow Up Time:    [employer] dose 1, 2, and 3

## 2022-04-21 NOTE — DISCHARGE NOTE OB - NS MD DC FALL RISK RISK
For information on Fall & Injury Prevention, visit: [email or web address] OR  [email or web address] OR  https://www.cdc.gov/steadi/patient.html

## 2022-04-21 NOTE — DISCHARGE NOTE OB - PATIENT PORTAL LINK FT
You can access the FollowMyHealth Patient Portal offered by [facility] by registering at the following website: [email or web address]. By joining [facility]’s FollowMyHealth portal, you will also be able to view your health information using other applications (apps) compatible with our system.

## 2022-04-21 NOTE — DISCHARGE NOTE OB - AVOID SITTING IN ONE POSITION FOR MORE THAN ONE HOUR
What Is The Reason For Today's Visit?: History of Non-Melanoma Skin Cancer
How Many Skin Cancers Have You Had?: more than one
Statement Selected

## 2022-04-21 NOTE — DISCHARGE NOTE OB - MEDICATION SUMMARY - MEDICATIONS TO TAKE
I will START or STAY ON the medications listed below when I get home from the hospital:    ibuprofen 600 mg oral tablet  -- 1 tab(s) by mouth every 6 hours  -- Indication: For LABOR

## 2022-04-21 NOTE — PROGRESS NOTE ADULT - SUBJECTIVE AND OBJECTIVE BOX
OB attending  PPD #2    Pt doing well, pain well controlled. No overnight events, no acute complaints.    Ambulating: Yes  Voiding: Yes  Flatus: Yes  Bowel movements: Yes   Breast or bottle feeding: Breastfeeding  Diet: Regular    PAST MEDICAL & SURGICAL HISTORY:  IBS (irritable bowel syndrome)    H/O umbilical hernia repair    History of D&amp;C        Physical Exam  Vital Signs Last 24 Hrs  T(C): 36.2 (2022 07:56), Max: 36.8 (2022 15:20)  T(F): 97.2 (2022 07:56), Max: 98.3 (2022 15:20)  HR: 77 (2022 07:56) (68 - 77)  BP: 109/59 (2022 07:56) (94/53 - 109/59)  BP(mean): --  RR: 18 (2022 23:45) (18 - 18)  SpO2: --  Gen: AAOx3, NAD  Abd: Soft, nontender, nondistended, BS+  Fundus: Firm, below umbilicus  Lochia: normal  Ext: No calf tenderness, no swelling    Labs:                        12.5   10.65 )-----------( 196      ( 2022 06:45 )             37.1         A/P: s/p , PPD #2, doing well  - continue current management  d/c home 
OB attending  PPD #1    Pt doing well, pain well controlled. No overnight events, no acute complaints.    Ambulating: Yes  Voiding: Yes  Flatus: Yes  Bowel movements: Yes   Breast or bottle feeding: Breastfeeding  Diet: Regular    PAST MEDICAL & SURGICAL HISTORY:  IBS (irritable bowel syndrome)    H/O umbilical hernia repair    History of D&amp;C        Physical Exam  Vital Signs Last 24 Hrs  T(C): 36.3 (2022 07:42), Max: 36.6 (2022 23:53)  T(F): 97.3 (2022 07:42), Max: 97.8 (2022 23:53)  HR: 77 (2022 07:42) (58 - 84)  BP: 105/58 (2022 07:42) (105/58 - 118/65)  BP(mean): --  RR: 18 (2022 07:42) (18 - 18)  SpO2: --  Gen: AAOx3, NAD  Abd: Soft, nontender, nondistended, BS+  Fundus: Firm, below umbilicus  Lochia: normal  Ext: No calf tenderness, no swelling    Labs:                        12.5   10.65 )-----------( 196      ( 2022 06:45 )             37.1         A/P:  s/p , PPD #1, doing well  - continue current management

## 2022-04-21 NOTE — DISCHARGE NOTE OB - HOSPITAL COURSE
22 @ 09:27    HPI:  37yo  @39w5d, DORIS: 22 by LMP c/w first trim sono, presents to L&D with complaint of contractions and leakage of fluid. Pain is 7/10, q3-5m. Leaking began at 1100. Denies vaginal bleeding. Good fetal movement. Pregnancy complicated by partial placenta previa at 20w6d. Placenta edge was noted to be 2.3cm from internal os on 3/23. Cleared for vaginal delivery. Denies other complications in this pregnancy. GBS neg. (2022 11:18)      PAST MEDICAL & SURGICAL HISTORY:  IBS (irritable bowel syndrome)    H/O umbilical hernia repair    History of D&amp;C        POST PARTUM COURSE:          LABS:                        12.5   10.65 )-----------( 196      ( 2022 06:45 )             37.1                         14.3   10.23 )-----------( 210      ( 2022 11:15 )             41.2                   Allergies    lactose (Other (Severe))  No Known Drug Allergies    Intolerances

## 2022-04-25 DIAGNOSIS — Z20.822 CONTACT WITH AND (SUSPECTED) EXPOSURE TO COVID-19: ICD-10-CM

## 2022-04-25 DIAGNOSIS — Z3A.39 39 WEEKS GESTATION OF PREGNANCY: ICD-10-CM

## 2022-05-30 ENCOUNTER — FORM ENCOUNTER (OUTPATIENT)
Age: 37
End: 2022-05-30

## 2022-08-04 ENCOUNTER — APPOINTMENT (OUTPATIENT)
Dept: INTERNAL MEDICINE | Facility: CLINIC | Age: 37
End: 2022-08-04

## 2022-08-04 ENCOUNTER — NON-APPOINTMENT (OUTPATIENT)
Age: 37
End: 2022-08-04

## 2022-08-04 VITALS
RESPIRATION RATE: 18 BRPM | HEIGHT: 66 IN | DIASTOLIC BLOOD PRESSURE: 70 MMHG | WEIGHT: 145 LBS | HEART RATE: 72 BPM | OXYGEN SATURATION: 98 % | TEMPERATURE: 98 F | BODY MASS INDEX: 23.3 KG/M2 | SYSTOLIC BLOOD PRESSURE: 120 MMHG

## 2022-08-04 DIAGNOSIS — K21.9 GASTRO-ESOPHAGEAL REFLUX DISEASE W/OUT ESOPHAGITIS: ICD-10-CM

## 2022-08-04 DIAGNOSIS — D64.9 ANEMIA, UNSPECIFIED: ICD-10-CM

## 2022-08-04 DIAGNOSIS — Z13.31 ENCOUNTER FOR SCREENING FOR DEPRESSION: ICD-10-CM

## 2022-08-04 DIAGNOSIS — J30.9 ALLERGIC RHINITIS, UNSPECIFIED: ICD-10-CM

## 2022-08-04 DIAGNOSIS — R61 GENERALIZED HYPERHIDROSIS: ICD-10-CM

## 2022-08-04 DIAGNOSIS — K58.9 IRRITABLE BOWEL SYNDROME W/OUT DIARRHEA: ICD-10-CM

## 2022-08-04 DIAGNOSIS — F41.9 ANXIETY DISORDER, UNSPECIFIED: ICD-10-CM

## 2022-08-04 DIAGNOSIS — Z80.3 FAMILY HISTORY OF MALIGNANT NEOPLASM OF BREAST: ICD-10-CM

## 2022-08-04 PROCEDURE — 36415 COLL VENOUS BLD VENIPUNCTURE: CPT

## 2022-08-04 PROCEDURE — G0444 DEPRESSION SCREEN ANNUAL: CPT | Mod: 59

## 2022-08-04 PROCEDURE — 99395 PREV VISIT EST AGE 18-39: CPT | Mod: 25

## 2022-08-04 PROCEDURE — 99214 OFFICE O/P EST MOD 30 MIN: CPT | Mod: 25

## 2022-08-04 NOTE — PHYSICAL EXAM
[No Acute Distress] : no acute distress [Well Nourished] : well nourished [Well Developed] : well developed [Well-Appearing] : well-appearing [Normal Sclera/Conjunctiva] : normal sclera/conjunctiva [PERRL] : pupils equal round and reactive to light [EOMI] : extraocular movements intact [Normal Outer Ear/Nose] : the outer ears and nose were normal in appearance [Normal Oropharynx] : the oropharynx was normal [No JVD] : no jugular venous distention [No Lymphadenopathy] : no lymphadenopathy [Supple] : supple [Thyroid Normal, No Nodules] : the thyroid was normal and there were no nodules present [No Respiratory Distress] : no respiratory distress  [No Accessory Muscle Use] : no accessory muscle use [Clear to Auscultation] : lungs were clear to auscultation bilaterally [Normal Rate] : normal rate  [Regular Rhythm] : with a regular rhythm [Normal S1, S2] : normal S1 and S2 [No Murmur] : no murmur heard [No Carotid Bruits] : no carotid bruits [No Abdominal Bruit] : a ~M bruit was not heard ~T in the abdomen [No Varicosities] : no varicosities [Pedal Pulses Present] : the pedal pulses are present [No Edema] : there was no peripheral edema [No Palpable Aorta] : no palpable aorta [No Extremity Clubbing/Cyanosis] : no extremity clubbing/cyanosis [Soft] : abdomen soft [Non Tender] : non-tender [Non-distended] : non-distended [No Masses] : no abdominal mass palpated [No HSM] : no HSM [Normal Bowel Sounds] : normal bowel sounds [Normal Posterior Cervical Nodes] : no posterior cervical lymphadenopathy [Normal Anterior Cervical Nodes] : no anterior cervical lymphadenopathy [No CVA Tenderness] : no CVA  tenderness [No Spinal Tenderness] : no spinal tenderness [No Joint Swelling] : no joint swelling [Grossly Normal Strength/Tone] : grossly normal strength/tone [No Rash] : no rash [Coordination Grossly Intact] : coordination grossly intact [No Focal Deficits] : no focal deficits [Normal Gait] : normal gait [Deep Tendon Reflexes (DTR)] : deep tendon reflexes were 2+ and symmetric [Normal Affect] : the affect was normal [Normal Insight/Judgement] : insight and judgment were intact [de-identified] : Diastases recti

## 2022-08-04 NOTE — ASSESSMENT
[FreeTextEntry1] : Annual physical routine preventative care and immunizations discussed with the patient healthy lifestyle diet and exercises self breast exam Pap blood work checked patient declined EKG recently was done and was normal\par IBS stable continue current regiment \par Allergic rhinitis stable continue current regiment\par Anxiety off meds doing well\par GERD continue PPI\par Night sweats unclear etiology check thyroid function check anemia.  Discussed with the patient likely secondary to hormones advised to follow-up with OB/GYN\par Anemia check labs\par Diastasis recti mild discussed with the patient possible concerns and complications.  At this point no intervention needed\par Follow-up pending labs\par

## 2022-08-04 NOTE — HISTORY OF PRESENT ILLNESS
[de-identified] : Patient presents for annual physical\par GERD/IBS controlled currently on PPIs and diet modification\par History of anemia denies dizziness fatigue chest pain shortness of breath\par Patient complaining of 3-month history of night sweats started shortly after delivering her baby.  Patient wakes up every night drenched in sweat.  Denies palpitations denies weight changes denies tremors anxiety depression.  Other people in the household are not experiencing similar symptoms patient states she sleeps with the AC\par Patient states during the day she does not experience any of the symptoms.  No respiratory symptoms

## 2022-08-04 NOTE — HEALTH RISK ASSESSMENT
[0] : 2) Feeling down, depressed, or hopeless: Not at all (0) [PHQ-2 Negative - No further assessment needed] : PHQ-2 Negative - No further assessment needed [Patient reported mammogram was normal] : Patient reported mammogram was normal [Patient reported PAP Smear was normal] : Patient reported PAP Smear was normal [[id number]] : 0 [MammogramDate] : 6/22 [PapSmearDate] : 2022

## 2022-08-06 LAB
25(OH)D3 SERPL-MCNC: 32.1 NG/ML
ALBUMIN SERPL ELPH-MCNC: 4.6 G/DL
ALP BLD-CCNC: 44 U/L
ALT SERPL-CCNC: 20 U/L
ANION GAP SERPL CALC-SCNC: 12 MMOL/L
APPEARANCE: CLEAR
AST SERPL-CCNC: 17 U/L
BASOPHILS # BLD AUTO: 0.03 K/UL
BASOPHILS NFR BLD AUTO: 0.7 %
BILIRUB SERPL-MCNC: 0.4 MG/DL
BILIRUBIN URINE: NEGATIVE
BLOOD URINE: NEGATIVE
BUN SERPL-MCNC: 15 MG/DL
CALCIUM SERPL-MCNC: 9.4 MG/DL
CHLORIDE SERPL-SCNC: 103 MMOL/L
CHOLEST SERPL-MCNC: 189 MG/DL
CO2 SERPL-SCNC: 24 MMOL/L
COLOR: YELLOW
CREAT SERPL-MCNC: 0.96 MG/DL
EGFR: 79 ML/MIN/1.73M2
EOSINOPHIL # BLD AUTO: 0.13 K/UL
EOSINOPHIL NFR BLD AUTO: 3 %
ESTIMATED AVERAGE GLUCOSE: 91 MG/DL
FERRITIN SERPL-MCNC: 35 NG/ML
FOLATE SERPL-MCNC: 12.4 NG/ML
GLUCOSE QUALITATIVE U: NEGATIVE
GLUCOSE SERPL-MCNC: 87 MG/DL
HBA1C MFR BLD HPLC: 4.8 %
HCT VFR BLD CALC: 44.8 %
HDLC SERPL-MCNC: 55 MG/DL
HGB BLD-MCNC: 14.6 G/DL
IMM GRANULOCYTES NFR BLD AUTO: 0.2 %
KETONES URINE: NEGATIVE
LDLC SERPL CALC-MCNC: 114 MG/DL
LEUKOCYTE ESTERASE URINE: NEGATIVE
LYMPHOCYTES # BLD AUTO: 1.52 K/UL
LYMPHOCYTES NFR BLD AUTO: 34.8 %
MAN DIFF?: NORMAL
MCHC RBC-ENTMCNC: 31.4 PG
MCHC RBC-ENTMCNC: 32.6 GM/DL
MCV RBC AUTO: 96.3 FL
MONOCYTES # BLD AUTO: 0.47 K/UL
MONOCYTES NFR BLD AUTO: 10.8 %
NEUTROPHILS # BLD AUTO: 2.21 K/UL
NEUTROPHILS NFR BLD AUTO: 50.5 %
NITRITE URINE: NEGATIVE
NONHDLC SERPL-MCNC: 134 MG/DL
PH URINE: 7
PLATELET # BLD AUTO: 238 K/UL
POTASSIUM SERPL-SCNC: 4.5 MMOL/L
PROT SERPL-MCNC: 7.4 G/DL
PROTEIN URINE: NORMAL
RBC # BLD: 4.65 M/UL
RBC # FLD: 13 %
SODIUM SERPL-SCNC: 139 MMOL/L
SPECIFIC GRAVITY URINE: 1.03
T3 SERPL-MCNC: 94 NG/DL
T4 FREE SERPL-MCNC: 1 NG/DL
T4 SERPL-MCNC: 6.4 UG/DL
TRIGL SERPL-MCNC: 101 MG/DL
TSH SERPL-ACNC: 1.39 UIU/ML
UROBILINOGEN URINE: NORMAL
VIT B12 SERPL-MCNC: 434 PG/ML
WBC # FLD AUTO: 4.37 K/UL

## 2022-08-08 ENCOUNTER — NON-APPOINTMENT (OUTPATIENT)
Age: 37
End: 2022-08-08

## 2022-08-11 LAB
IRON SATN MFR SERPL: 37 %
IRON SERPL-MCNC: 107 UG/DL
MAGNESIUM SERPL-MCNC: 2.1 MG/DL
TIBC SERPL-MCNC: 293 UG/DL
UIBC SERPL-MCNC: 186 UG/DL

## 2022-08-18 ENCOUNTER — APPOINTMENT (OUTPATIENT)
Dept: INTERNAL MEDICINE | Facility: CLINIC | Age: 37
End: 2022-08-18

## 2022-09-21 ENCOUNTER — APPOINTMENT (OUTPATIENT)
Dept: INTERNAL MEDICINE | Facility: CLINIC | Age: 37
End: 2022-09-21

## 2022-09-21 VITALS
HEART RATE: 74 BPM | BODY MASS INDEX: 23.3 KG/M2 | SYSTOLIC BLOOD PRESSURE: 114 MMHG | TEMPERATURE: 97.4 F | DIASTOLIC BLOOD PRESSURE: 72 MMHG | OXYGEN SATURATION: 99 % | WEIGHT: 145 LBS | HEIGHT: 66 IN | RESPIRATION RATE: 18 BRPM

## 2022-09-21 DIAGNOSIS — H10.9 UNSPECIFIED CONJUNCTIVITIS: ICD-10-CM

## 2022-09-21 DIAGNOSIS — R11.0 NAUSEA: ICD-10-CM

## 2022-09-21 DIAGNOSIS — M62.08 SEPARATION OF MUSCLE (NONTRAUMATIC), OTHER SITE: ICD-10-CM

## 2022-09-21 PROCEDURE — 99214 OFFICE O/P EST MOD 30 MIN: CPT

## 2022-09-28 PROBLEM — H10.9 CONJUNCTIVITIS: Status: RESOLVED | Noted: 2022-09-21 | Resolved: 2022-10-21

## 2022-09-28 PROBLEM — R11.0 NAUSEA: Status: ACTIVE | Noted: 2022-09-21

## 2022-09-28 PROBLEM — M62.08 DIASTASIS RECTI: Status: ACTIVE | Noted: 2022-08-04

## 2022-09-28 NOTE — HISTORY OF PRESENT ILLNESS
[FreeTextEntry1] : right eye redness [de-identified] : 38 y/o female presents with concerns for right eye redness and tearing. She is also requesting a prescription for Zofran that she takes for associated nausea with migraines. She is also requesting a PT referral for history of diastasis recti. She feels otherwise well and offers no other acute complaints or concerns. ROS as documented below.

## 2022-09-28 NOTE — REVIEW OF SYSTEMS
[Redness] : redness [Nausea] : nausea [Headache] : headache [Fever] : no fever [Chills] : no chills [Recent Change In Weight] : ~T no recent weight change [Discharge] : no discharge [Pain] : no pain [Vision Problems] : no vision problems [Earache] : no earache [Nasal Discharge] : no nasal discharge [Sore Throat] : no sore throat [Chest Pain] : no chest pain [Palpitations] : no palpitations [Shortness Of Breath] : no shortness of breath [Wheezing] : no wheezing [Abdominal Pain] : no abdominal pain [Diarrhea] : diarrhea [Vomiting] : no vomiting [Dysuria] : no dysuria [Hematuria] : no hematuria [Frequency] : no frequency [Joint Pain] : no joint pain [Joint Swelling] : no joint swelling [Skin Rash] : no skin rash [Dizziness] : no dizziness [Anxiety] : no anxiety [Depression] : no depression [Swollen Glands] : no swollen glands

## 2022-09-28 NOTE — PHYSICAL EXAM
[No Acute Distress] : no acute distress [Well Nourished] : well nourished [Well Developed] : well developed [Well-Appearing] : well-appearing [PERRL] : pupils equal round and reactive to light [EOMI] : extraocular movements intact [Normal Outer Ear/Nose] : the outer ears and nose were normal in appearance [Normal Oropharynx] : the oropharynx was normal [No JVD] : no jugular venous distention [No Lymphadenopathy] : no lymphadenopathy [Supple] : supple [Thyroid Normal, No Nodules] : the thyroid was normal and there were no nodules present [No Respiratory Distress] : no respiratory distress  [No Accessory Muscle Use] : no accessory muscle use [Clear to Auscultation] : lungs were clear to auscultation bilaterally [Normal Rate] : normal rate  [Regular Rhythm] : with a regular rhythm [Normal S1, S2] : normal S1 and S2 [No Murmur] : no murmur heard [No Carotid Bruits] : no carotid bruits [No Abdominal Bruit] : a ~M bruit was not heard ~T in the abdomen [No Varicosities] : no varicosities [Pedal Pulses Present] : the pedal pulses are present [No Edema] : there was no peripheral edema [No Palpable Aorta] : no palpable aorta [No Extremity Clubbing/Cyanosis] : no extremity clubbing/cyanosis [Soft] : abdomen soft [Non Tender] : non-tender [Non-distended] : non-distended [No Masses] : no abdominal mass palpated [No HSM] : no HSM [Normal Bowel Sounds] : normal bowel sounds [Normal Posterior Cervical Nodes] : no posterior cervical lymphadenopathy [Normal Anterior Cervical Nodes] : no anterior cervical lymphadenopathy [No CVA Tenderness] : no CVA  tenderness [No Spinal Tenderness] : no spinal tenderness [No Joint Swelling] : no joint swelling [Grossly Normal Strength/Tone] : grossly normal strength/tone [No Rash] : no rash [Coordination Grossly Intact] : coordination grossly intact [No Focal Deficits] : no focal deficits [Normal Gait] : normal gait [Deep Tendon Reflexes (DTR)] : deep tendon reflexes were 2+ and symmetric [Normal Affect] : the affect was normal [Normal Insight/Judgement] : insight and judgment were intact [de-identified] : right eye: conjunctival injection

## 2022-10-13 PROBLEM — Z13.31 SCREENING FOR DEPRESSION: Status: ACTIVE | Noted: 2022-08-04

## 2022-12-19 ENCOUNTER — APPOINTMENT (OUTPATIENT)
Dept: INTERNAL MEDICINE | Facility: CLINIC | Age: 37
End: 2022-12-19

## 2022-12-19 VITALS
RESPIRATION RATE: 18 BRPM | BODY MASS INDEX: 22.82 KG/M2 | WEIGHT: 142 LBS | TEMPERATURE: 97.2 F | HEART RATE: 76 BPM | DIASTOLIC BLOOD PRESSURE: 74 MMHG | SYSTOLIC BLOOD PRESSURE: 114 MMHG | HEIGHT: 66 IN | OXYGEN SATURATION: 100 %

## 2022-12-19 DIAGNOSIS — R41.3 OTHER AMNESIA: ICD-10-CM

## 2022-12-19 DIAGNOSIS — R29.898 OTHER SYMPTOMS AND SIGNS INVOLVING THE MUSCULOSKELETAL SYSTEM: ICD-10-CM

## 2022-12-19 PROCEDURE — 99214 OFFICE O/P EST MOD 30 MIN: CPT

## 2022-12-21 PROBLEM — R29.898 UPPER EXTREMITY WEAKNESS: Status: ACTIVE | Noted: 2022-12-21

## 2022-12-21 PROBLEM — R41.3 MEMORY CHANGE: Status: ACTIVE | Noted: 2022-12-21

## 2022-12-21 NOTE — PHYSICAL EXAM
[No Acute Distress] : no acute distress [Well Nourished] : well nourished [Well Developed] : well developed [Well-Appearing] : well-appearing [Normal Sclera/Conjunctiva] : normal sclera/conjunctiva [PERRL] : pupils equal round and reactive to light [EOMI] : extraocular movements intact [Normal Outer Ear/Nose] : the outer ears and nose were normal in appearance [Normal Oropharynx] : the oropharynx was normal [No JVD] : no jugular venous distention [No Lymphadenopathy] : no lymphadenopathy [Supple] : supple [Thyroid Normal, No Nodules] : the thyroid was normal and there were no nodules present [No Respiratory Distress] : no respiratory distress  [No Accessory Muscle Use] : no accessory muscle use [Clear to Auscultation] : lungs were clear to auscultation bilaterally [Normal Rate] : normal rate  [Regular Rhythm] : with a regular rhythm [Normal S1, S2] : normal S1 and S2 [No Murmur] : no murmur heard [No Carotid Bruits] : no carotid bruits [No Abdominal Bruit] : a ~M bruit was not heard ~T in the abdomen [No Varicosities] : no varicosities [Pedal Pulses Present] : the pedal pulses are present [No Edema] : there was no peripheral edema [No Palpable Aorta] : no palpable aorta [No Extremity Clubbing/Cyanosis] : no extremity clubbing/cyanosis [Soft] : abdomen soft [Non Tender] : non-tender [Non-distended] : non-distended [No Masses] : no abdominal mass palpated [No HSM] : no HSM [Normal Bowel Sounds] : normal bowel sounds [Normal Posterior Cervical Nodes] : no posterior cervical lymphadenopathy [Normal Anterior Cervical Nodes] : no anterior cervical lymphadenopathy [No CVA Tenderness] : no CVA  tenderness [No Spinal Tenderness] : no spinal tenderness [No Joint Swelling] : no joint swelling [Grossly Normal Strength/Tone] : grossly normal strength/tone [No Rash] : no rash [Coordination Grossly Intact] : coordination grossly intact [No Focal Deficits] : no focal deficits [Normal Gait] : normal gait [Deep Tendon Reflexes (DTR)] : deep tendon reflexes were 2+ and symmetric [Normal Affect] : the affect was normal [Normal Insight/Judgement] : insight and judgment were intact [de-identified] : bue- M=5/5, S=5/5

## 2022-12-21 NOTE — ASSESSMENT
[FreeTextEntry1] : 8mos  at home with 4 other children. Not getting enough sleep. Carrying . Likely cause of STM decrease is above. BUE weakness likely carpal tunnel due to carrying baby. will try wrist brace at night x 2 wks, if no improvement, then neuro eval, bue ncs. \par Total time 30 min ( 20 min. FTF and 10 min chart review and documentation.)\par

## 2022-12-21 NOTE — HISTORY OF PRESENT ILLNESS
[FreeTextEntry1] : dropping things [de-identified] : recently dropping things-x wks. no numbness or tingling. \par also notes- memory changes\par diastasis recti- seeing pt\par s/p  8 mos ago- not nursing. not getting enough sleep.

## 2023-01-23 ENCOUNTER — APPOINTMENT (OUTPATIENT)
Dept: OBGYN | Facility: CLINIC | Age: 38
End: 2023-01-23
Payer: COMMERCIAL

## 2023-01-23 VITALS
WEIGHT: 293 LBS | HEIGHT: 66 IN | DIASTOLIC BLOOD PRESSURE: 80 MMHG | BODY MASS INDEX: 47.09 KG/M2 | SYSTOLIC BLOOD PRESSURE: 114 MMHG

## 2023-01-23 DIAGNOSIS — R10.2 PELVIC AND PERINEAL PAIN: ICD-10-CM

## 2023-01-23 LAB
BILIRUB UR QL STRIP: NORMAL
GLUCOSE UR-MCNC: NORMAL
HCG UR QL: 0.2 EU/DL
HCG UR QL: NEGATIVE
HGB UR QL STRIP.AUTO: NORMAL
KETONES UR-MCNC: NORMAL
LEUKOCYTE ESTERASE UR QL STRIP: NORMAL
NITRITE UR QL STRIP: NORMAL
PH UR STRIP: 5.5
PROT UR STRIP-MCNC: NORMAL
SP GR UR STRIP: >=1.03

## 2023-01-23 PROCEDURE — 76830 TRANSVAGINAL US NON-OB: CPT

## 2023-01-23 PROCEDURE — 81003 URINALYSIS AUTO W/O SCOPE: CPT | Mod: QW

## 2023-01-23 PROCEDURE — 81025 URINE PREGNANCY TEST: CPT

## 2023-01-23 PROCEDURE — 99213 OFFICE O/P EST LOW 20 MIN: CPT | Mod: 25

## 2023-01-23 NOTE — HISTORY OF PRESENT ILLNESS
[FreeTextEntry1] : pt comes in for evaluation of lower pelvic pain\par intermittent comes and goes\par started physical therapy to strengthen core muscles in october\par reg cycles no menomet\par  no meds no bc pills\par hx of inflammatory bowel syndrome \par \par urine dip neg for uti\par urine dip neg for preg \par

## 2023-01-23 NOTE — PLAN
[FreeTextEntry1] : pelvic pain\par  likely due to muscular etiology\par normal gyn anatomy\par pt reassured

## 2023-01-23 NOTE — COUNSELING
[Breast Self Exam] : breast self exam [Bladder Hygiene] : bladder hygiene [Contraception/ Emergency Contraception/ Safe Sexual Practices] : contraception, emergency contraception, safe sexual practices [Pregnancy Options] : pregnancy options [Influenza Vaccine] : influenza vaccine [Lab Results] : lab results

## 2023-02-06 ENCOUNTER — APPOINTMENT (OUTPATIENT)
Dept: OBGYN | Facility: CLINIC | Age: 38
End: 2023-02-06
Payer: COMMERCIAL

## 2023-02-06 ENCOUNTER — LABORATORY RESULT (OUTPATIENT)
Age: 38
End: 2023-02-06

## 2023-02-06 VITALS
DIASTOLIC BLOOD PRESSURE: 80 MMHG | BODY MASS INDEX: 23.46 KG/M2 | SYSTOLIC BLOOD PRESSURE: 121 MMHG | HEIGHT: 66 IN | WEIGHT: 146 LBS

## 2023-02-06 DIAGNOSIS — O20.9 HEMORRHAGE IN EARLY PREGNANCY, UNSPECIFIED: ICD-10-CM

## 2023-02-06 DIAGNOSIS — Z86.018 PERSONAL HISTORY OF OTHER BENIGN NEOPLASM: ICD-10-CM

## 2023-02-06 DIAGNOSIS — Z87.59 PERSONAL HISTORY OF OTHER COMPLICATIONS OF PREGNANCY, CHILDBIRTH AND THE PUERPERIUM: ICD-10-CM

## 2023-02-06 DIAGNOSIS — Z32.01 ENCOUNTER FOR PREGNANCY TEST, RESULT POSITIVE: ICD-10-CM

## 2023-02-06 LAB
BILIRUB UR QL STRIP: NORMAL
GLUCOSE UR-MCNC: NORMAL
HCG UR QL: 0.2 EU/DL
HCG UR QL: NEGATIVE
HGB UR QL STRIP.AUTO: NORMAL
KETONES UR-MCNC: NORMAL
LEUKOCYTE ESTERASE UR QL STRIP: NORMAL
NITRITE UR QL STRIP: NORMAL
PH UR STRIP: 5.5
PROT UR STRIP-MCNC: NORMAL
QUALITY CONTROL: YES
SP GR UR STRIP: 1.03

## 2023-02-06 PROCEDURE — 81025 URINE PREGNANCY TEST: CPT

## 2023-02-06 PROCEDURE — 76830 TRANSVAGINAL US NON-OB: CPT

## 2023-02-06 PROCEDURE — 81003 URINALYSIS AUTO W/O SCOPE: CPT | Mod: QW

## 2023-02-06 PROCEDURE — 99213 OFFICE O/P EST LOW 20 MIN: CPT

## 2023-02-06 NOTE — PHYSICAL EXAM
[Chaperone Present] : A chaperone was present in the examining room during all aspects of the physical examination [Labia Majora] : normal [Labia Minora] : normal [Scant] : There was scant vaginal bleeding [Normal] : normal [Uterine Adnexae] : normal [FreeTextEntry5] : closed

## 2023-02-06 NOTE — PLAN
[FreeTextEntry1] : Completed  likely, benign exam\par -beta sent w/ cbc\par - will follow \par -precautions rev \par -rv prn or annual exam

## 2023-02-06 NOTE — PROCEDURE
[R/O Ectopic Pregnancy] : rule out ectopic pregnancy [Threatened Miscarriage] : threatened miscarriage [Transvaginal Ultrasound] : transvaginal ultrasound [Anteverted] : anteverted [FreeTextEntry3] : + ucg at home , bleeding  [FreeTextEntry5] : Av uterus , no IUG seen, ee 8.1 mm, no ret poc or tissue noted , No ff  [FreeTextEntry7] : 3.2 x 2.1 cm , no masses [FreeTextEntry8] : 3.9 x 1.8 cm   no masses [FreeTextEntry4] : Normal sonogram, no retained POC or tissue noted , c/w completed

## 2023-02-06 NOTE — HISTORY OF PRESENT ILLNESS
[FreeTextEntry1] : Patient is a , LMP , here for positive pregnancy test and vaginal bleeding. Thinks she has early miscarriage, passed some clot today less bleeding and cramps.\par \par Nsd x 5, Sab x 1, Pap 2021 \par Rh +

## 2023-02-07 LAB
BASOPHILS # BLD AUTO: 0.2 K/UL
BASOPHILS NFR BLD AUTO: 3.5 %
EOSINOPHIL # BLD AUTO: 0.39 K/UL
EOSINOPHIL NFR BLD AUTO: 6.9 %
HCG SERPL-MCNC: 109 MIU/ML
HCT VFR BLD CALC: 40.7 %
HGB BLD-MCNC: 13.5 G/DL
LYMPHOCYTES # BLD AUTO: 1.29 K/UL
LYMPHOCYTES NFR BLD AUTO: 22.6 %
MAN DIFF?: NORMAL
MCHC RBC-ENTMCNC: 30.3 PG
MCHC RBC-ENTMCNC: 33.2 GM/DL
MCV RBC AUTO: 91.3 FL
MONOCYTES # BLD AUTO: 0.5 K/UL
MONOCYTES NFR BLD AUTO: 8.7 %
NEUTROPHILS # BLD AUTO: 3.32 K/UL
NEUTROPHILS NFR BLD AUTO: 58.3 %
PLATELET # BLD AUTO: 258 K/UL
RBC # BLD: 4.46 M/UL
RBC # FLD: 13.5 %
WBC # FLD AUTO: 5.69 K/UL

## 2023-02-08 ENCOUNTER — APPOINTMENT (OUTPATIENT)
Dept: OBGYN | Facility: CLINIC | Age: 38
End: 2023-02-08
Payer: COMMERCIAL

## 2023-02-08 PROCEDURE — 36415 COLL VENOUS BLD VENIPUNCTURE: CPT

## 2023-02-15 ENCOUNTER — APPOINTMENT (OUTPATIENT)
Dept: OBGYN | Facility: CLINIC | Age: 38
End: 2023-02-15
Payer: COMMERCIAL

## 2023-02-15 LAB — HCG SERPL-MCNC: 31 MIU/ML

## 2023-02-15 PROCEDURE — 36415 COLL VENOUS BLD VENIPUNCTURE: CPT

## 2023-02-16 LAB — HCG SERPL-MCNC: 2 MIU/ML

## 2023-02-21 DIAGNOSIS — Z78.9 OTHER SPECIFIED HEALTH STATUS: ICD-10-CM

## 2023-03-13 RX ORDER — NORELGESTROMIN AND ETHINYL ESTRADIOL 150; 35 UG/D; UG/D
150-35 PATCH TRANSDERMAL
Qty: 3 | Refills: 5 | Status: ACTIVE | COMMUNITY
Start: 2023-03-13 | End: 1900-01-01

## 2023-03-14 ENCOUNTER — RX RENEWAL (OUTPATIENT)
Age: 38
End: 2023-03-14

## 2023-06-09 ENCOUNTER — EMERGENCY (EMERGENCY)
Facility: HOSPITAL | Age: 38
LOS: 1 days | End: 2023-06-09
Attending: STUDENT IN AN ORGANIZED HEALTH CARE EDUCATION/TRAINING PROGRAM
Payer: COMMERCIAL

## 2023-06-09 VITALS
RESPIRATION RATE: 20 BRPM | HEIGHT: 66 IN | HEART RATE: 80 BPM | OXYGEN SATURATION: 98 % | TEMPERATURE: 99 F | WEIGHT: 139.99 LBS | DIASTOLIC BLOOD PRESSURE: 75 MMHG | SYSTOLIC BLOOD PRESSURE: 115 MMHG

## 2023-06-09 DIAGNOSIS — Z98.890 OTHER SPECIFIED POSTPROCEDURAL STATES: Chronic | ICD-10-CM

## 2023-06-09 LAB
ALBUMIN SERPL ELPH-MCNC: 4 G/DL — SIGNIFICANT CHANGE UP (ref 3.3–5)
ALP SERPL-CCNC: 29 U/L — LOW (ref 40–120)
ALT FLD-CCNC: 18 U/L — SIGNIFICANT CHANGE UP (ref 10–45)
ANION GAP SERPL CALC-SCNC: 10 MMOL/L — SIGNIFICANT CHANGE UP (ref 5–17)
APTT BLD: 30.7 SEC — SIGNIFICANT CHANGE UP (ref 27.5–35.5)
AST SERPL-CCNC: 29 U/L — SIGNIFICANT CHANGE UP (ref 10–40)
BASOPHILS # BLD AUTO: 0.03 K/UL — SIGNIFICANT CHANGE UP (ref 0–0.2)
BASOPHILS NFR BLD AUTO: 0.6 % — SIGNIFICANT CHANGE UP (ref 0–2)
BILIRUB SERPL-MCNC: 0.2 MG/DL — SIGNIFICANT CHANGE UP (ref 0.2–1.2)
BUN SERPL-MCNC: 12 MG/DL — SIGNIFICANT CHANGE UP (ref 7–23)
CALCIUM SERPL-MCNC: 9.3 MG/DL — SIGNIFICANT CHANGE UP (ref 8.4–10.5)
CHLORIDE SERPL-SCNC: 104 MMOL/L — SIGNIFICANT CHANGE UP (ref 96–108)
CO2 SERPL-SCNC: 24 MMOL/L — SIGNIFICANT CHANGE UP (ref 22–31)
CREAT SERPL-MCNC: 0.98 MG/DL — SIGNIFICANT CHANGE UP (ref 0.5–1.3)
EGFR: 76 ML/MIN/1.73M2 — SIGNIFICANT CHANGE UP
EOSINOPHIL # BLD AUTO: 0.09 K/UL — SIGNIFICANT CHANGE UP (ref 0–0.5)
EOSINOPHIL NFR BLD AUTO: 1.8 % — SIGNIFICANT CHANGE UP (ref 0–6)
GLUCOSE SERPL-MCNC: 91 MG/DL — SIGNIFICANT CHANGE UP (ref 70–99)
HCG SERPL-ACNC: <2 MIU/ML — SIGNIFICANT CHANGE UP
HCT VFR BLD CALC: 41.8 % — SIGNIFICANT CHANGE UP (ref 34.5–45)
HGB BLD-MCNC: 13.8 G/DL — SIGNIFICANT CHANGE UP (ref 11.5–15.5)
IMM GRANULOCYTES NFR BLD AUTO: 0.4 % — SIGNIFICANT CHANGE UP (ref 0–0.9)
INR BLD: 1.03 RATIO — SIGNIFICANT CHANGE UP (ref 0.88–1.16)
LYMPHOCYTES # BLD AUTO: 1.65 K/UL — SIGNIFICANT CHANGE UP (ref 1–3.3)
LYMPHOCYTES # BLD AUTO: 32.4 % — SIGNIFICANT CHANGE UP (ref 13–44)
MCHC RBC-ENTMCNC: 29.3 PG — SIGNIFICANT CHANGE UP (ref 27–34)
MCHC RBC-ENTMCNC: 33 GM/DL — SIGNIFICANT CHANGE UP (ref 32–36)
MCV RBC AUTO: 88.7 FL — SIGNIFICANT CHANGE UP (ref 80–100)
MONOCYTES # BLD AUTO: 0.52 K/UL — SIGNIFICANT CHANGE UP (ref 0–0.9)
MONOCYTES NFR BLD AUTO: 10.2 % — SIGNIFICANT CHANGE UP (ref 2–14)
NEUTROPHILS # BLD AUTO: 2.78 K/UL — SIGNIFICANT CHANGE UP (ref 1.8–7.4)
NEUTROPHILS NFR BLD AUTO: 54.6 % — SIGNIFICANT CHANGE UP (ref 43–77)
NRBC # BLD: 0 /100 WBCS — SIGNIFICANT CHANGE UP (ref 0–0)
PLATELET # BLD AUTO: 263 K/UL — SIGNIFICANT CHANGE UP (ref 150–400)
POTASSIUM SERPL-MCNC: 4.8 MMOL/L — SIGNIFICANT CHANGE UP (ref 3.5–5.3)
POTASSIUM SERPL-SCNC: 4.8 MMOL/L — SIGNIFICANT CHANGE UP (ref 3.5–5.3)
PROT SERPL-MCNC: 7.5 G/DL — SIGNIFICANT CHANGE UP (ref 6–8.3)
PROTHROM AB SERPL-ACNC: 12 SEC — SIGNIFICANT CHANGE UP (ref 10.5–13.4)
RBC # BLD: 4.71 M/UL — SIGNIFICANT CHANGE UP (ref 3.8–5.2)
RBC # FLD: 13.2 % — SIGNIFICANT CHANGE UP (ref 10.3–14.5)
SODIUM SERPL-SCNC: 138 MMOL/L — SIGNIFICANT CHANGE UP (ref 135–145)
TROPONIN T, HIGH SENSITIVITY RESULT: <6 NG/L — SIGNIFICANT CHANGE UP (ref 0–51)
WBC # BLD: 5.09 K/UL — SIGNIFICANT CHANGE UP (ref 3.8–10.5)
WBC # FLD AUTO: 5.09 K/UL — SIGNIFICANT CHANGE UP (ref 3.8–10.5)

## 2023-06-09 PROCEDURE — 84132 ASSAY OF SERUM POTASSIUM: CPT

## 2023-06-09 PROCEDURE — 70551 MRI BRAIN STEM W/O DYE: CPT | Mod: 26,MA

## 2023-06-09 PROCEDURE — 99285 EMERGENCY DEPT VISIT HI MDM: CPT | Mod: 25

## 2023-06-09 PROCEDURE — 99223 1ST HOSP IP/OBS HIGH 75: CPT

## 2023-06-09 PROCEDURE — 84702 CHORIONIC GONADOTROPIN TEST: CPT

## 2023-06-09 PROCEDURE — 85025 COMPLETE CBC W/AUTO DIFF WBC: CPT

## 2023-06-09 PROCEDURE — 70498 CT ANGIOGRAPHY NECK: CPT | Mod: MA

## 2023-06-09 PROCEDURE — 82947 ASSAY GLUCOSE BLOOD QUANT: CPT

## 2023-06-09 PROCEDURE — 80053 COMPREHEN METABOLIC PANEL: CPT

## 2023-06-09 PROCEDURE — 70496 CT ANGIOGRAPHY HEAD: CPT | Mod: MA

## 2023-06-09 PROCEDURE — 93306 TTE W/DOPPLER COMPLETE: CPT | Mod: 26

## 2023-06-09 PROCEDURE — 82435 ASSAY OF BLOOD CHLORIDE: CPT

## 2023-06-09 PROCEDURE — G0378: CPT

## 2023-06-09 PROCEDURE — 84295 ASSAY OF SERUM SODIUM: CPT

## 2023-06-09 PROCEDURE — 85014 HEMATOCRIT: CPT

## 2023-06-09 PROCEDURE — 70450 CT HEAD/BRAIN W/O DYE: CPT | Mod: MA

## 2023-06-09 PROCEDURE — 85610 PROTHROMBIN TIME: CPT

## 2023-06-09 PROCEDURE — 85730 THROMBOPLASTIN TIME PARTIAL: CPT

## 2023-06-09 PROCEDURE — 82330 ASSAY OF CALCIUM: CPT

## 2023-06-09 PROCEDURE — 70498 CT ANGIOGRAPHY NECK: CPT | Mod: 26,MA

## 2023-06-09 PROCEDURE — 36415 COLL VENOUS BLD VENIPUNCTURE: CPT

## 2023-06-09 PROCEDURE — 82962 GLUCOSE BLOOD TEST: CPT

## 2023-06-09 PROCEDURE — C8929: CPT

## 2023-06-09 PROCEDURE — 0042T: CPT | Mod: MA

## 2023-06-09 PROCEDURE — 70496 CT ANGIOGRAPHY HEAD: CPT | Mod: 26,MA

## 2023-06-09 PROCEDURE — 70551 MRI BRAIN STEM W/O DYE: CPT | Mod: MA

## 2023-06-09 PROCEDURE — 82803 BLOOD GASES ANY COMBINATION: CPT

## 2023-06-09 PROCEDURE — 83605 ASSAY OF LACTIC ACID: CPT

## 2023-06-09 PROCEDURE — 96374 THER/PROPH/DIAG INJ IV PUSH: CPT | Mod: XU

## 2023-06-09 PROCEDURE — 82565 ASSAY OF CREATININE: CPT

## 2023-06-09 PROCEDURE — 84484 ASSAY OF TROPONIN QUANT: CPT

## 2023-06-09 PROCEDURE — 85018 HEMOGLOBIN: CPT

## 2023-06-09 RX ORDER — FLUTICASONE PROPIONATE 50 MCG
1 SPRAY, SUSPENSION NASAL
Refills: 0 | DISCHARGE

## 2023-06-09 RX ORDER — MONTELUKAST 4 MG/1
1 TABLET, CHEWABLE ORAL
Refills: 0 | DISCHARGE

## 2023-06-09 RX ORDER — EPINASTINE HYDROCHLORIDE 0.5 MG/ML
1 SOLUTION OPHTHALMIC
Refills: 0 | DISCHARGE

## 2023-06-09 RX ORDER — METOCLOPRAMIDE HCL 10 MG
10 TABLET ORAL ONCE
Refills: 0 | Status: COMPLETED | OUTPATIENT
Start: 2023-06-09 | End: 2023-06-09

## 2023-06-09 RX ORDER — PANTOPRAZOLE SODIUM 20 MG/1
1 TABLET, DELAYED RELEASE ORAL
Refills: 0 | DISCHARGE

## 2023-06-09 RX ORDER — CETIRIZINE HYDROCHLORIDE 10 MG/1
1 TABLET ORAL
Refills: 0 | DISCHARGE

## 2023-06-09 RX ADMIN — Medication 10 MILLIGRAM(S): at 13:27

## 2023-06-09 NOTE — ED PROVIDER NOTE - CLINICAL SUMMARY MEDICAL DECISION MAKING FREE TEXT BOX
37-year-old female history of complex migraines, anxiety presenting with left-sided blurry vision associated with headache, pressure behind the left eye as well as expressive aphasia.  Last known normal was 2 hours ago approximately at 10:30 AM.  Patient has had a history of migraines and states that the symptoms occurred previously when she also had a panic attack.  Vital signs stable, afebrile, not hypoxic. Code stroke evaluated. Plan for labs, imaging, neuro recs. Final dispo pending   Differential diagnosis includes but not limited to stroke vs. complex migraine

## 2023-06-09 NOTE — CONSULT NOTE ADULT - NSCONSULTADDITIONALINFOA_GEN_ALL_CORE
ATTENDING ATTESTATION:    Case discussed over the phone. Pt discharged prior to attending evaluation.  MRI brain neg    Sejal Morgan M.D

## 2023-06-09 NOTE — ED CDU PROVIDER INITIAL DAY NOTE - DETAILS
38 y/o female with blurry vision, aphasia and headache which has resolved  - mri   - neuro checks  - tte  - reassessment  - symptom control   - discussed with ed team

## 2023-06-09 NOTE — ED PROVIDER NOTE - PROGRESS NOTE DETAILS
Joseph De La Torre DO (PGY1)  patient with NIH score of 0 on initial exam, no neuro deficits. code stroke activated. dispo pending reassessment and neuro recs. Joseph De La Torre DO (PGY1)  patient with NIH score of 0 on initial exam, no neuro deficits. code stroke activated. will give reglan for headache/nausea. dispo pending reassessment and neuro recs. Joseph De La Torre DO (PGY1)  Spoke with neurology who are reviewing the case.  CT is grossly normal with no signs of stroke.  Ordered for Reglan for patient.  Neurology recommending CDU for MRI.  Consulted CDU. CDU to see patient Joseph De La Torre DO (PGY1)  CDU accepted patient. patient to go to CDU

## 2023-06-09 NOTE — ED CDU PROVIDER INITIAL DAY NOTE - PROGRESS NOTE DETAILS
Pt endorsing improvement in symptoms. MRI performed and resulted. Discussed with neurology, recommending outpatient follow up. - Radha Carbone PA-C

## 2023-06-09 NOTE — ED CDU PROVIDER INITIAL DAY NOTE - CLINICAL SUMMARY MEDICAL DECISION MAKING FREE TEXT BOX
Attending (Kadeem Jarrett D.O.):   37-year-old female history of complex migraines, anxiety presenting with left-sided blurry vision associated with headache, pressure behind the left eye as well as expressive aphasia.  Last known normal was 2 hours ago approximately at 10:30 AM. Patient states this feels like combo or prior anxiety and migraines. Patient is hemodynam stable here, NIHSS 0. Patient aaox4 to person, place, time, event. CNs intact w/ symm facies, PERRL 3mm, EOMI w/o nystagmus, normal phonation. 5/5 str all 4 extrem w/ intact sensation to touch. Well coordinated. No drift. Steady gait. 2+ distal pulses, equal b/l. History and exam more consistent with peripheral neurologic etiology not consistent with primary neurovasc etiology such as stroke, seizure. Lower suspicion for cardiac pathology. Possible metabolic derrangement. Maybe complex migraine. Check labs, cardiac enzymes, CTA head and neck, CXR, EKG, discuss with neurology. -> CTs nonactionable. Neuro recs obs for MR, neuro checks, tele. Patient agreeable to plan.

## 2023-06-09 NOTE — ED CDU PROVIDER INITIAL DAY NOTE - ATTENDING APP SHARED VISIT CONTRIBUTION OF CARE
I, Dr. Kadeem Jarrett, have personally performed a face to face diagnostic evaluation on this patient. I have reviewed the ACP note and agree with the history, exam, and plan of care, except where noted.

## 2023-06-09 NOTE — ED CDU PROVIDER DISPOSITION NOTE - ATTENDING CONTRIBUTION TO CARE
I, Dr. Kadeem Jarrett, have personally performed a face to face diagnostic evaluation on this patient. I have reviewed the ACP note and agree with the history, exam, and plan of care, except where noted. I, Dr. Kadeem Jarrett, have personally performed a face to face diagnostic evaluation on this patient. I have reviewed the ACP note and agree with the history, exam, and plan of care, except where noted.    Attending MD Simpson:     Seen in CDU [facility], accompanied by     37F with PMH/PSH including complex migraines, anxiety sent to the CDU after presenting to the ED with  L sided blurry vision, headache, expressive aphasia.  Reports all symptoms for which she presented have resolved and not recurred.  Denies any other physical complaints.  Patient evaluated and feeling improved.  No acute issues at  this time.  Lab and radiology tests reviewed with patient and .  Patient instructed to follow up with her PMD Dr. Cox and bring results (printed extra copy of results as per patient request) and will follow up with Dr. Arcos for neuro.  Patient stable and eager for discharge. Follow up instructions given, importance of follow up emphasized, return to ED parameters reviewed and patient verbalized understanding.  All questions answered, all concerns addressed.     Exam:   General: NAD  HENT: head NCAT, airway patent   Chest: symmetric chest rise, no increased work of breathing  MSK: ranging neck freely  Neuro: moving all extremities spontaneously, sensory grossly intact, no gross neuro deficits  Psych: normal mood and affect

## 2023-06-09 NOTE — ED CDU PROVIDER DISPOSITION NOTE - NSFOLLOWUPINSTRUCTIONS_ED_ALL_ED_FT
1. Follow up with your PCP within 2-3 days.   2. Follow up with neurology within 2-3 days.   3. Continue home medications.   4. Rest. Hydrate.   5. Return to the emergency department if you have worsening pain, swelling, vomiting, headache, changes in vision, fainting or any other concerning symptoms. 1. Follow up with your PCP within 2-3 days.   2. Follow up with neurology within 2-3 days. Call the office to make a follow up appointment.   3. Continue home medications.   4. Rest. Hydrate.   5. Return to the emergency department if you have worsening pain, swelling, vomiting, headache, changes in vision, fainting or any other concerning symptoms.    Connor Arcos  Neurology  [address], [address]  [place] [address]  Phone: [phone]  Fax: [phone]

## 2023-06-09 NOTE — ED PROVIDER NOTE - PHYSICAL EXAMINATION
Joseph De La Torre DO (PGY1)   Physical Exam:    Gen: NAD, AOx3, non-toxic appearing, able to ambulate without assistance  Head: NCAT  HEENT: EOMI, PEERLA, normal conjunctiva, tongue midline, oral mucosa moist  Lung: CTAB, no respiratory distress, no wheezes/rhonchi/rales B/L  CV: RRR, no murmurs, rubs or gallops  Abd: soft, NT, ND, no guarding, no rigidity, no rebound tenderness, no CVA tenderness   MSK: no visible deformities, ROM normal in UE/LE, no back pain  Neuro: No focal sensory or motor deficits, Cranial Nerves:  --CN II: PERRL 3mm  --CN III, IV, VI: EOMI bilateral no nystagmus  --CN V1-3: Facial sensation intact to touch  --CN VII: No facial asymmetry or droop  --CN VIII: Hearing intact to rubbing fingers b/l  --CN IX, X: Palate elevates symmetrically. Normal phonation  --CN XI: Heading turning and shoulder shrug intact b/l  --CN XII: Tongue midline with normal movements   Skin: Warm, well perfused, no rash, no leg swelling  Psych: normal affect, calm

## 2023-06-09 NOTE — ED ADULT NURSE NOTE - NSFALLUNIVINTERV_ED_ALL_ED
Bed/Stretcher in lowest position, wheels locked, appropriate side rails in place/Call bell, personal items and telephone in reach/Instruct patient to call for assistance before getting out of bed/chair/stretcher/Non-slip footwear applied when patient is off stretcher/[place] to call system/Physically safe environment - no spills, clutter or unnecessary equipment/Purposeful proactive rounding/Room/bathroom lighting operational, light cord in reach

## 2023-06-09 NOTE — ED PROVIDER NOTE - INTERNATIONAL TRAVEL
The patient stopped in today to have a drivers license formed filled out.  The patient also asked for a reading glasses Rx, which was printed and given to the patient today.    No

## 2023-06-09 NOTE — CONSULT NOTE ADULT - SUBJECTIVE AND OBJECTIVE BOX
Neurology - Consult Note    -  Spectra: [phone] ([facility]), [phone] ([facility])  -    HPI: Patient LUCIA GARCÍA is a 37y (1985) woman, Hx IBS, anxiety, migraines, on contraceptive patch, presents to [facility] ED as a code stroke for headache with pressure, L eye blurry vision, difficulty getting words out, onset around 10:45AM, LKN 10:30AM. Patient also complains of some memory difficulty for last few days. She reports she woke up at her baseline at 7AM today. Patient denies being on any blood thinners.       Review of Systems:  INCOMPLETE   CONSTITUTIONAL: No fevers or chills  EYES AND ENT: No visual changes or no throat pain   NECK: No pain or stiffness  RESPIRATORY: No hemoptysis or shortness of breath  CARDIOVASCULAR: No chest pain or palpitations  GASTROINTESTINAL: No melena or hematochezia  GENITOURINARY: No dysuria or hematuria  NEUROLOGICAL: +As stated in HPI above  SKIN: No itching, burning, rashes, or lesions   All other review of systems is negative unless indicated above.    Allergies:  lactose (Other (Severe))  No Known Drug Allergies      PMHx/PSHx/Family Hx: As above, otherwise see below   No pertinent past medical history    IBS (irritable bowel syndrome)        Social Hx:  No current use of tobacco, alcohol, or illicit drugs  Lives with ***    Medications:  MEDICATIONS  (STANDING):    MEDICATIONS  (PRN):      Vitals:  T(C): 37 (06-09-23 @ 12:14), Max: 37 (06-09-23 @ 12:14)  HR: 80 (06-09-23 @ 12:14) (80 - 80)  BP: 115/75 (06-09-23 @ 12:14) (115/75 - 115/75)  RR: 20 (06-09-23 @ 12:14) (20 - 20)  SpO2: 98% (06-09-23 @ 12:14) (98% - 98%)    Physical Examination: INCOMPLETE  General - NAD  Cardiovascular - Peripheral pulses palpable, no edema  Eyes - Fundoscopy with flat, sharp optic discs and no hemorrhage or exudates; Fundoscopy not well visualized; Fundoscopy not performed due to safety precautions in the setting of the COVID-19 pandemic    Neurologic Exam:  Mental status - Awake, Alert, Oriented to person, place, and time. Speech fluent, repetition and naming intact. Follows simple and complex commands. Attention/concentration, recent and remote memory (including registration and recall), and fund of knowledge intact    Cranial nerves - PERRLA, VFF, EOMI, face sensation (V1-V3) intact b/l, facial strength intact without asymmetry b/l, hearing intact b/l, palate with symmetric elevation, trapezius OR sternocleidomastiod 5/5 strength b/l, tongue midline on protrusion with full lateral movement    Motor - Normal bulk and tone throughout. No pronator drift.  Strength testing            Deltoid      Biceps      Triceps     Wrist Extension    Wrist Flexion     Interossei         R            5                 5               5                     5                              5                        5                 5  L             5                 5               5                     5                              5                        5                 5              Hip Flexion    Hip Extension    Knee Flexion    Knee Extension    Dorsiflexion    Plantar Flexion  R              5                           5                       5                           5                            5                          5  L              5                           5                        5                           5                            5                          5    Sensation - Light touch/temperature OR pain/vibration intact throughout    DTR's -             Biceps      Triceps     Brachioradialis      Patellar    Ankle    Toes/plantar response  R             2+             2+                  2+                       2+            2+                 Down  L              2+             2+                 2+                        2+           2+                 Down    Coordination - Finger to Nose intact b/l. No tremors appreciated    Gait and station - Normal casual gait. Romberg (-)    Labs:          CAPILLARY BLOOD GLUCOSE      POCT Blood Glucose.: 104 mg/dL (09 Jun 2023 12:31)          CSF:                  Radiology:     Neurology - Consult Note    -  Spectra: [phone] ([facility]), [phone] ([facility])  -    HPI: Patient LUCIA GARCÍA is a 37y (1985) RH woman, Hx IBS, anxiety, migraines, on contraceptive patch, presents to [facility] ED as a code stroke for headache with pressure, L eye blurry vision, difficulty getting words out, onset around 10:45AM, LKN 10:30AM. Patient also complains of some memory difficulty for last few days. Patient denies being on any blood thinners. Patient reports she had a sudden onset headache, peak to onset within seconds, however less severe than her migraine headaches, left sided, still continuing, pressure-like behind L eye. She had some blurry vision in her L eye earlier, which resolved. She had checked each eye individually to see which eye was blurry. Patient reports she was having difficulty with her speech; she knew what she wanted to say but had a hard time saying it. Her speech is now back at baseline. Patient denies any weakness in arms or legs, sensory changes/pain/numbness in arms or legs, any fevers, chills, nausea, vomiting, diarrhea, constipation. She does endorse some vertigo but says it is tolerable. Dizziness is worsened with walking and head movement.      Review of Systems:   CONSTITUTIONAL: No fevers or chills  EYES AND ENT: Visual back at baseline, no throat pain   NECK: No pain or stiffness  RESPIRATORY: No hemoptysis or shortness of breath  CARDIOVASCULAR: No chest pain or palpitations  GASTROINTESTINAL: No melena or hematochezia  GENITOURINARY: No dysuria or hematuria  NEUROLOGICAL: +As stated in HPI above  SKIN: No itching, burning, rashes, or lesions        Allergies:  lactose (Other (Severe))  No Known Drug Allergies      PMHx/PSHx/Family Hx: As above, otherwise see below   IBS (irritable bowel syndrome)        Social Hx:  No current use of tobacco, alcohol, or illicit drugs       Medications:  MEDICATIONS  (STANDING):    MEDICATIONS  (PRN):      Vitals:  T(C): 37 (06-09-23 @ 12:14), Max: 37 (06-09-23 @ 12:14)  HR: 80 (06-09-23 @ 12:14) (80 - 80)  BP: 115/75 (06-09-23 @ 12:14) (115/75 - 115/75)  RR: 20 (06-09-23 @ 12:14) (20 - 20)  SpO2: 98% (06-09-23 @ 12:14) (98% - 98%)    Physical Examination:   General - NAD  Cardiovascular - Peripheral pulses palpable, no edema  Eyes -  Fundoscopy not performed due to safety precautions in the setting of the COVID-19 pandemic    Neurologic Exam:  Mental status - Awake, Alert, Oriented to person, place, and time. Speech fluent, repetition and naming intact. Follows simple and complex commands. Attention/concentration, recent and remote memory (including registration and recall), and fund of knowledge intact    Cranial nerves - PERRL, VFF, EOMI, face sensation (V1-V3) intact b/l, facial strength intact without asymmetry b/l, hearing intact b/l, palate with symmetric elevation, trapezius   5/5 strength b/l, tongue midline on protrusion with full lateral movement    Motor - Normal bulk and tone throughout. No pronator drift.  Strength testing            Deltoid      Biceps      Triceps     Wrist Extension    Wrist Flexion     Interossei         R            5                 5               5                     5                              5                        5                 5  L             5                 5               5                     5                              5                        5                 5              Hip Flexion    Hip Extension    Knee Flexion    Knee Extension    Dorsiflexion    Plantar Flexion  R              5                           5                       5                           5                            5                          5  L              5                           5                        5                           5                            5                          5    Sensation - Light touch  intact throughout    DTR's -             Biceps      Triceps     Brachioradialis      Patellar    Ankle    Toes/plantar response  R             2+             2+                  2+                       2+            2+                 Down  L              2+             2+                 2+                        2+           2+                 Down    Coordination - Finger to Nose intact b/l. No tremors appreciated    Gait and station - Normal casual gait. Romberg (-)    HINTS exam negative for corrective saccades, nystagmus, skew.    Labs:          CAPILLARY BLOOD GLUCOSE      POCT Blood Glucose.: 104 mg/dL (09 Jun 2023 12:31)          CSF:            Radiology:        ACC: [id number] EXAM: CT ANGIO BRAIN STROKE PROTC IC ORDERED BY: ALBERTO WEISS    ACC: [id number] EXAM: CT ANGIO NECK STROKE PROTCL IC ORDERED BY: ALBERTO WEISS    ACC: [id number] EXAM: CT BRAIN STROKE PROTOCOL ORDERED BY: ALBERTO WEISS    ACC: [id number] EXAM: CT BRAIN PERFUSION MAPS STROKE ORDERED BY: ALBERTO WEISS    PROCEDURE DATE: 06/09/2023        INTERPRETATION: CLINICAL INDICATION: Code stroke for expressive aphasia, anxiety, headache    5mm axial sections of the brain were obtained from base to vertex, without the intravenous administration of contrast material. Coronal and sagittal computer generated reconstructed views are available.    No prior brain imaging is available for comparison.    The fourth, third and lateral ventricles are normal size and position. There is no hemorrhage, mass or shift of the midline structures. There is normal gray white matter differentiation. Bone window examination is unremarkable.     CT perfusion:    After the intravenous administration of 50 cc of Omnipaque 300, serial thin sections were obtained through the brain for the purposes of evaluating CT perfusion, 50 cc were discarded. Raw data as sent to the rapid software for postprocessing.    No core infarct is identified. There is 3 mL of tissue with delayed mean transit time in the right temporal lobe.      CBF<30% volume:0 ml  Tmax> 6.0s volume: 3ml  Mismatch volume:3 ml  Mismatch ratio:infinite    CTA Head and Neck:    After the intravenous power injection of 70 cc of Omnipaque 300 using a bolus chloe timing run serial thin sections were obtained through the neck from the thoracic inlet through the intracranial circulation centered at the [id number]-cy-[id number] on a multislice CT scanner reformatted with coronal and sagittal 2 D-MIP projections, including 3 D reconstructions using a separate 3D Vitrea software workstation.A total of 120 cc of Omnipaque were intravenously injected. 80 cc were discarded.    Rapid AI evaluation for hemorrhage demonstrates no hemorrhage suspected.    The origins of the carotid normal. The vertebral arteries are codominant. The carotid bifurcations are normal bilaterally.        The distal vertebral arteries are well identified as are the posterior-inferior cerebellar arteries bilaterally. The region of the vertebral basilar junction is normal. The basilar artery is normal. The posterior cerebral and superior cerebellar arteries are normal.    Evaluation of the carotid arteries demonstrate normal appearance to the distal cervical, petrous cavernous and supraclinoid internal carotid arteries. The anterior cerebral arteries anterior communicating artery and middle cerebral arteries are normal.    There is no evidence of aneurysm, stenosis, or vessel occlusion.    The normal intracranial venous circulation is identified. The right transverse sinus is dominant. The superior sagittal sinus, internal cerebral veins, vein of Johnny, straight sinus, transverse sinuses, sigmoid sinuses and internal jugular veins are normal. Cortical veins are normal.    Review of the regional soft tissues of the neck are unremarkable.        IMPRESSION: Unremarkable noncontrast brain CT. Normal CTA of the head and neck. Demonstrates 3 mm of tissue with delayed mean transit time in the right temporal lobe.    Dr. Tovar discussed these findings with neurology provider on 6/9/2023 12:46 PM with read back.    --- End of Report ---       Neurology - Consult Note    -  Spectra: [phone] ([facility]), [phone] ([facility])  -    HPI: Patient LUCIA GARCÍA is a 37y (1985) RH woman, Hx IBS, anxiety, migraines, on contraceptive patch, presents to [facility] ED as a code stroke for headache with pressure, L eye blurry vision, difficulty getting words out, onset around 10:45AM, LKN 10:30AM. Patient also complains of some memory difficulty for last few days. Patient denies being on any blood thinners. Patient reports she had a sudden onset headache, onset to peak within seconds, however less severe than her migraine headaches, left sided, still continuing, pressure-like behind L eye. She had some blurry vision in her L eye earlier, which resolved. She had checked each eye individually to see which eye was blurry. Patient reports she was having difficulty with her speech; she knew what she wanted to say but had a hard time saying it. Her speech is now back at baseline. Patient denies any weakness in arms or legs, sensory changes/pain/numbness in arms or legs, any fevers, chills, nausea, vomiting, diarrhea, constipation. She does endorse some vertigo but says it is tolerable. Dizziness is worsened with walking and head movement.      Review of Systems:   CONSTITUTIONAL: No fevers or chills  EYES AND ENT: Visual back at baseline, no throat pain   NECK: No pain or stiffness  RESPIRATORY: No hemoptysis or shortness of breath  CARDIOVASCULAR: No chest pain or palpitations  GASTROINTESTINAL: No melena or hematochezia  GENITOURINARY: No dysuria or hematuria  NEUROLOGICAL: +As stated in HPI above  SKIN: No itching, burning, rashes, or lesions        Allergies:  lactose (Other (Severe))  No Known Drug Allergies      PMHx/PSHx/Family Hx: As above, otherwise see below   IBS (irritable bowel syndrome)        Social Hx:  No current use of tobacco, alcohol, or illicit drugs       Medications:  MEDICATIONS  (STANDING):    MEDICATIONS  (PRN):      Vitals:  T(C): 37 (06-09-23 @ 12:14), Max: 37 (06-09-23 @ 12:14)  HR: 80 (06-09-23 @ 12:14) (80 - 80)  BP: 115/75 (06-09-23 @ 12:14) (115/75 - 115/75)  RR: 20 (06-09-23 @ 12:14) (20 - 20)  SpO2: 98% (06-09-23 @ 12:14) (98% - 98%)    Physical Examination:   General - NAD  Cardiovascular - Peripheral pulses palpable, no edema  Eyes -  Fundoscopy not performed due to safety precautions in the setting of the COVID-19 pandemic    Neurologic Exam:  Mental status - Awake, Alert, Oriented to person, place, and time. Speech fluent, repetition and naming intact. Follows simple and complex commands. Attention/concentration, recent and remote memory (including registration and recall), and fund of knowledge intact    Cranial nerves - PERRL, VFF, EOMI, face sensation (V1-V3) intact b/l, facial strength intact without asymmetry b/l, hearing intact b/l, palate with symmetric elevation, trapezius   5/5 strength b/l, tongue midline on protrusion with full lateral movement    Motor - Normal bulk and tone throughout. No pronator drift.  Strength testing            Deltoid      Biceps      Triceps     Wrist Extension    Wrist Flexion     Interossei         R            5                 5               5                     5                              5                        5                 5  L             5                 5               5                     5                              5                        5                 5              Hip Flexion    Hip Extension    Knee Flexion    Knee Extension    Dorsiflexion    Plantar Flexion  R              5                           5                       5                           5                            5                          5  L              5                           5                        5                           5                            5                          5    Sensation - Light touch  intact throughout    DTR's -             Biceps      Triceps     Brachioradialis      Patellar    Ankle    Toes/plantar response  R             2+             2+                  2+                       2+            2+                 Down  L              2+             2+                 2+                        2+           2+                 Down    Coordination - Finger to Nose intact b/l. No tremors appreciated    Gait and station - Normal casual gait. Romberg (-)    HINTS exam negative for corrective saccades, nystagmus, skew.    Labs:          CAPILLARY BLOOD GLUCOSE      POCT Blood Glucose.: 104 mg/dL (09 Jun 2023 12:31)          CSF:            Radiology:        ACC: [id number] EXAM: CT ANGIO BRAIN STROKE PROTC IC ORDERED BY: ALBERTO WEISS    ACC: [id number] EXAM: CT ANGIO NECK STROKE PROTCL IC ORDERED BY: ALBERTO WEISS    ACC: [id number] EXAM: CT BRAIN STROKE PROTOCOL ORDERED BY: ALBERTO WEISS    ACC: [id number] EXAM: CT BRAIN PERFUSION MAPS STROKE ORDERED BY: ALBERTO WEISS    PROCEDURE DATE: 06/09/2023        INTERPRETATION: CLINICAL INDICATION: Code stroke for expressive aphasia, anxiety, headache    5mm axial sections of the brain were obtained from base to vertex, without the intravenous administration of contrast material. Coronal and sagittal computer generated reconstructed views are available.    No prior brain imaging is available for comparison.    The fourth, third and lateral ventricles are normal size and position. There is no hemorrhage, mass or shift of the midline structures. There is normal gray white matter differentiation. Bone window examination is unremarkable.     CT perfusion:    After the intravenous administration of 50 cc of Omnipaque 300, serial thin sections were obtained through the brain for the purposes of evaluating CT perfusion, 50 cc were discarded. Raw data as sent to the rapid software for postprocessing.    No core infarct is identified. There is 3 mL of tissue with delayed mean transit time in the right temporal lobe.      CBF<30% volume:0 ml  Tmax> 6.0s volume: 3ml  Mismatch volume:3 ml  Mismatch ratio:infinite    CTA Head and Neck:    After the intravenous power injection of 70 cc of Omnipaque 300 using a bolus chloe timing run serial thin sections were obtained through the neck from the thoracic inlet through the intracranial circulation centered at the sxuack-op-Zrtdve on a multislice CT scanner reformatted with coronal and sagittal 2 D-MIP projections, including 3 D reconstructions using a separate 3D Vitrea software workstation.A total of 120 cc of Omnipaque were intravenously injected. 80 cc were discarded.    Rapid AI evaluation for hemorrhage demonstrates no hemorrhage suspected.    The origins of the carotid normal. The vertebral arteries are codominant. The carotid bifurcations are normal bilaterally.        The distal vertebral arteries are well identified as are the posterior-inferior cerebellar arteries bilaterally. The region of the vertebral basilar junction is normal. The basilar artery is normal. The posterior cerebral and superior cerebellar arteries are normal.    Evaluation of the carotid arteries demonstrate normal appearance to the distal cervical, petrous cavernous and supraclinoid internal carotid arteries. The anterior cerebral arteries anterior communicating artery and middle cerebral arteries are normal.    There is no evidence of aneurysm, stenosis, or vessel occlusion.    The normal intracranial venous circulation is identified. The right transverse sinus is dominant. The superior sagittal sinus, internal cerebral veins, vein of Johnny, straight sinus, transverse sinuses, sigmoid sinuses and internal jugular veins are normal. Cortical veins are normal.    Review of the regional soft tissues of the neck are unremarkable.        IMPRESSION: Unremarkable noncontrast brain CT. Normal CTA of the head and neck. Demonstrates 3 mm of tissue with delayed mean transit time in the right temporal lobe.    Dr. Tovar discussed these findings with neurology provider on 6/9/2023 12:46 PM with read back.    --- End of Report ---

## 2023-06-09 NOTE — ED CDU PROVIDER INITIAL DAY NOTE - NS ED ATTENDING STATEMENT MOD
This was a shared visit with the PEREZ. I reviewed and verified the documentation and independently performed the documented:

## 2023-06-09 NOTE — ED CDU PROVIDER DISPOSITION NOTE - CLINICAL COURSE
37-year-old female history of complex migraines, anxiety presenting with left-sided blurry vision associated with headache, pressure behind the left eye as well as expressive aphasia.  Last known normal was 2 hours ago approximately at 10:30 AM.  Patient has had a history of migraines and states that the symptoms occurred previously when she also had a panic attack.  States some dizziness when moving from the stretcher.  Denies any fevers chills chest pain shortness of breath abdominal pain nausea vomiting or diarrhea. While in ED Patient had labs and CT scans which were unremarkable. Will place in cdu for MRI, neuro checks and echo.     While in CDU ____ 37-year-old female history of complex migraines, anxiety presenting with left-sided blurry vision associated with headache, pressure behind the left eye as well as expressive aphasia.  Last known normal was 2 hours ago approximately at 10:30 AM.  Patient has had a history of migraines and states that the symptoms occurred previously when she also had a panic attack.  States some dizziness when moving from the stretcher.  Denies any fevers chills chest pain shortness of breath abdominal pain nausea vomiting or diarrhea. While in ED Patient had labs and CT scans which were unremarkable. Will place in cdu for MRI, neuro checks and echo.     While in CDU echo and MRI performed. Cleared by neurology, with improving symptoms per patient. Cleared by Dr. Simpson for discharge home with outpatient follow up.

## 2023-06-09 NOTE — ED CDU PROVIDER INITIAL DAY NOTE - OBJECTIVE STATEMENT
37-year-old female history of complex migraines, anxiety presenting with left-sided blurry vision associated with headache, pressure behind the left eye as well as expressive aphasia.  Last known normal was 2 hours ago approximately at 10:30 AM.  Patient has had a history of migraines and states that the symptoms occurred previously when she also had a panic attack.  States some dizziness when moving from the stretcher.  Denies any fevers chills chest pain shortness of breath abdominal pain nausea vomiting or diarrhea. While in ED Patient had labs and CT scans which were unremarkable. Will place in cdu for MRI, neuro checks and echo.

## 2023-06-09 NOTE — ED CLERICAL - NS ED CLERK UNITS
CDU1 How Many Mls Were Removed From The 80 Mg/Ml (5ml) Vial When Preparing The Injectable Solution?: 0 Medical Necessity Clause: This procedure was medically necessary because the lesions that were treated were: Lot # For Kenalog (Optional): [id number] Ndc# For Kenalog Only: 0373-3754-53 Validate Note Data When Using Inventory: Yes Total Volume (Ccs): 0.5 Concentration Of Kenalog Solution Injected (Mg/Ml): 10.0 Include Z78.9 (Other Specified Conditions Influencing Health Status) As An Associated Diagnosis?: No Show Inventory Tab: Hide Detail Level: Detailed Expiration Date For Kenalog (Optional): 3/23 Consent: The risks of atrophy were reviewed with the patient. Administered By (Optional): Sofia Coleman PA-C Kenalog Preparation: Kenalog

## 2023-06-09 NOTE — ED ADULT NURSE REASSESSMENT NOTE - NS ED NURSE REASSESS COMMENT FT1
15.00 Received the Pt from  CELI Gambino . Pt is Observed for Headache which is resolved now for MRI & ECHO . Received the Pt A&OX 4 obeys commands Ninfa N/V/D fever chills cp SOB   Comfort care & safety measures continued  IV site looks clean & dry no signs of infiltration noted pt denies  pain IV site .  Pt is advised to call for help  call bell with in the reach pt verbalized the understanding .  pending CDU  MD pantoja . GCS 15/15 A&OX 4 PERRLA  size 3 Strong upper & lower extremities steady gait   No facial droop  No Hand Leg drop denies numbness tingling Continue to monitor
report receivef from CELI almanzar -pt just arrived from MRI, VSS. denies needs at this time.

## 2023-06-09 NOTE — ED PROVIDER NOTE - ATTENDING CONTRIBUTION TO CARE
Attending (Kadeem Jarrett D.O.):  I have personally seen and examined this patient. I have performed a substantive portion of the visit including all aspects of the medical decision making. Resident, fellow, student, and/or ACP note reviewed. I agree on the plan of care except where noted.    37-year-old female history of complex migraines, anxiety presenting with left-sided blurry vision associated with headache, pressure behind the left eye as well as expressive aphasia.  Last known normal was 2 hours ago approximately at 10:30 AM. Attending (Kadeem Jarrett D.O.):  I have personally seen and examined this patient. I have performed a substantive portion of the visit including all aspects of the medical decision making. Resident, fellow, student, and/or ACP note reviewed. I agree on the plan of care except where noted.    37-year-old female history of complex migraines, anxiety presenting with left-sided blurry vision associated with headache, pressure behind the left eye as well as expressive aphasia.  Last known normal was 2 hours ago approximately at 10:30 AM. Patient states this feels like combo or prior anxiety and migraines. Patient is hemodynam stable here, NIHSS 0. Patient aaox4 to person, place, time, event. CNs intact w/ symm facies, PERRL 3mm, EOMI w/o nystagmus, normal phonation. 5/5 str all 4 extrem w/ intact sensation to touch. Well coordinated. No drift. Steady gait. 2+ distal pulses, equal b/l. History and exam more consistent with peripheral neurologic etiology not consistent with primary neurovasc etiology such as stroke, seizure. Lower suspicion for cardiac pathology. Possible metabolic derrangement. Maybe complex migraine. Check labs, cardiac enzymes, CTA head and neck, CXR, EKG, discuss with neurology. -> CTs nonactionable. Neuro recs obs for MRArmand Patient agreeable to plan.

## 2023-06-09 NOTE — CONSULT NOTE ADULT - ASSESSMENT
Assessment:  37y (1985) RH woman, Hx IBS, anxiety, migraines, on contraceptive patch, presents to [facility] ED as a code stroke for headache with pressure, L eye blurry vision, difficulty getting words out, onset around 10:45AM, LKN 10:30AM. Patient also complains of some memory difficulty for last few days. Patient denies being on any blood thinners. Patient reports she had a sudden onset headache, peak to onset within seconds, however less severe than her migraine headaches, left sided, still continuing, pressure-like behind L eye. She had some blurry vision in her L eye earlier, which resolved. She had checked each eye individually to see which eye was blurry. Patient reports she was having difficulty with her speech; she knew what she wanted to say but had a hard time saying it. Her speech is now back at baseline. Patient denies any weakness in arms or legs, sensory changes/pain/numbness in arms or legs, any fevers, chills, nausea, vomiting, diarrhea, constipation. She does endorse some vertigo but says it is tolerable.     Not a tenecteplase candidate due to non-debilitating deficits, low NIHSS, improving symptoms. Not an MT candidate due to no LVO.     mRS: 0  LKN: 10:30AM  NIHSS: 0  ABCD2 3    Impression: Dizziness (improving), blurry vision (now resolved), speech difficulty (resolved), headache, in patient with hx of migraine. Differentials include acute ischemic event vs migraine with aura. Difficult to rule out central etiology in setting of negative HINT exam, sudden onset, new pattern of headache. Symptoms non-debilitating and improving, low NIHSS so not a tenecteplase candidate. No LVO on imaging so not an MT candidate.     Recommendations:  [] CDU for MRI brain without contrast   [] TTE w/ bubble   [] Lipid panel, HbA1c, CBC, CMP, coagulation panel, troponin  [] Atorvastatin 80mg (titrate to LDL < 70)   [] DVT prophylaxis   [] NPO unless passes dysphagia screen; swallow eval if fails  [] ASA 81mg daily  [] Plavix 75mg daily x 3 weeks per CHANCE  [] Keep BP permissive up to 220/110 for 48 hours followed by gradual normotension over 2-3 days   [] Telemonitoring  [] Neurological checks and vital signs per unit protocol  [] BGM goals 140-180  [] PT/OT; S/S evaluation    Case and plan discussed with stroke fellow Dr. Cross.    * Case and plan not final until Attending attestation * Assessment:  37y (1985) RH woman, Hx IBS, anxiety, migraines, on contraceptive patch, presents to [facility] ED as a code stroke for headache with pressure, L eye blurry vision, difficulty getting words out, onset around 10:45AM, LKN 10:30AM. Patient also complains of some memory difficulty for last few days. Patient denies being on any blood thinners. Patient reports she had a sudden onset headache, peak to onset within seconds, however less severe than her migraine headaches, left sided, still continuing, pressure-like behind L eye. She had some blurry vision in her L eye earlier, which resolved. She had checked each eye individually to see which eye was blurry. Patient reports she was having difficulty with her speech; she knew what she wanted to say but had a hard time saying it. Her speech is now back at baseline. Patient denies any weakness in arms or legs, sensory changes/pain/numbness in arms or legs, any fevers, chills, nausea, vomiting, diarrhea, constipation. She does endorse some vertigo but says it is tolerable.     Not a tenecteplase candidate due to non-debilitating deficits, low NIHSS, improving symptoms. Not an MT candidate due to no LVO.     mRS: 0  LKN: 10:30AM  NIHSS: 0  ABCD2 3    Impression: Dizziness (improving), blurry vision (now resolved), speech difficulty (resolved), headache, in patient with hx of migraine. Differentials include acute ischemic event vs migraine with aura. Difficult to rule out central etiology in setting of negative HINT exam, sudden onset, new pattern of headache. Symptoms non-debilitating and improving, low NIHSS so not a tenecteplase candidate. No LVO on imaging so not an MT candidate.     Recommendations:  [] CDU for MRI brain without contrast   [] Can trial headache treatment with Tylenol 1G IV x1, Reglan 10MG IVP x1, Benadryl 25MG IVP x1, toradol  [] orthostatics, telemetry, baseline EKG   [] symptomatic management of vertigo w/ IVF, Meclizine 12.5-50 mg BID, diazepam 1mg q12 hrs PO   [] TTE w/ bubble   [] Lipid panel, HbA1c, CBC, CMP, coagulation panel, troponin  [] Atorvastatin 80mg (titrate to LDL < 70)   [] DVT prophylaxis   [] NPO unless passes dysphagia screen; swallow eval if fails  [] ASA 81mg daily  [] Plavix 75mg daily x 3 weeks per [facility]ANCE  [] Keep BP permissive up to 220/110 for 48 hours followed by gradual normotension over 2-3 days   [] Telemonitoring  [] Neurological checks and vital signs per unit protocol  [] BGM goals 140-180  [] PT/OT; S/S evaluation    Case and plan discussed with stroke fellow Dr. Cross.    * Case and plan not final until Attending attestation * Assessment:  37y (1985) RH woman, Hx IBS, anxiety, migraines, on contraceptive patch, presents to [facility] ED as a code stroke for headache with pressure, L eye blurry vision, difficulty getting words out, onset around 10:45AM, LKN 10:30AM. Patient also complains of some memory difficulty for last few days. Patient denies being on any blood thinners. Patient reports she had a sudden onset headache, onset to peak within seconds, however less severe than her migraine headaches, left sided, still continuing, pressure-like behind L eye. She had some blurry vision in her L eye earlier, which resolved. She had checked each eye individually to see which eye was blurry. Patient reports she was having difficulty with her speech; she knew what she wanted to say but had a hard time saying it. Her speech is now back at baseline. Patient denies any weakness in arms or legs, sensory changes/pain/numbness in arms or legs, any fevers, chills, nausea, vomiting, diarrhea, constipation. She does endorse some vertigo but says it is tolerable.     Not a tenecteplase candidate due to non-debilitating deficits, low NIHSS, improving symptoms. Not an MT candidate due to no LVO.     mRS: 0  LKN: 10:30AM  NIHSS: 0  ABCD2 3    Impression: Dizziness (improving), blurry vision (now resolved), speech difficulty (resolved), headache, in patient with hx of migraine. Differentials include acute ischemic event vs migraine with aura. Difficult to rule out central etiology in setting of negative HINT exam, sudden onset, new pattern of headache. Symptoms non-debilitating and improving, low NIHSS so not a tenecteplase candidate. No LVO on imaging so not an MT candidate.     Recommendations:  [] CDU for MRI brain without contrast   [] Can trial headache treatment with Tylenol 1G IV x1, Reglan 10MG IVP x1, Benadryl 25MG IVP x1, toradol  [] orthostatics, telemetry, baseline EKG   [] symptomatic management of vertigo w/ IVF, Meclizine 12.5-50 mg BID, diazepam 1mg q12 hrs PO   [] TTE w/ bubble   [] Lipid panel, HbA1c, CBC, CMP, coagulation panel, troponin  [] Atorvastatin 80mg (titrate to LDL < 70)   [] DVT prophylaxis   [] NPO unless passes dysphagia screen; swallow eval if fails  [] ASA 81mg daily  [] Plavix 75mg daily x 3 weeks per CHANCE  [] Keep BP permissive up to 220/110 for 48 hours followed by gradual normotension over 2-3 days   [] Telemonitoring  [] Neurological checks and vital signs per unit protocol  [] BGM goals 140-180  [] PT/OT; S/S evaluation    Case and plan discussed with stroke fellow Dr. Cross.    * Case and plan not final until Attending attestation *

## 2023-06-09 NOTE — CHART NOTE - NSCHARTNOTEFT_GEN_A_CORE
IMAGING   < from: MR Head No Cont (06.09.23 @ 20:53) >    FINDINGS:    There is no evidence of acute infarct. There are no foci of   susceptibility artifact to suggest hemorrhage. The ventricles are normal   in size for patient's age.    Flow voids of the major intracranial vessels at the skull base follow   expected course and contour. Right frontal developmental venous anomaly.    The paranasal sinuses demonstrate no signal abnormality. The mastoids   demonstrate no signal abnormality.  Bilateral orbits are within normal   limits.      IMPRESSION: No acute infarct, hemorrhage, or mass effect.    < end of copied text >    IMPRESSION     RECOMMENDATION   [] No neurological contraindication to discharge as patient is otherwise stable  [] Patient can follow up with Dr. Connor Arcos at [address], [place] [address]. Phone number [phone]    Patient case discussed with Dr. Morgan, Neurology attending

## 2023-06-09 NOTE — ED PROVIDER NOTE - OBJECTIVE STATEMENT
37-year-old female history of complex migraines, anxiety presenting with left-sided blurry vision associated with headache, pressure behind the left eye as well as expressive aphasia.  Last known normal was 2 hours ago approximately at 10:30 AM.  Patient has had a history of migraines and states that the symptoms occurred previously when she also had a panic attack.  States some dizziness when moving from the stretcher.  Denies any fevers chills chest pain shortness of breath abdominal pain nausea vomiting or diarrhea.

## 2023-06-09 NOTE — ED CDU PROVIDER DISPOSITION NOTE - PATIENT PORTAL LINK FT
You can access the FollowMyHealth Patient Portal offered by [facility] by registering at the following website: [email or web address]. By joining [facility]’s FollowMyHealth portal, you will also be able to view your health information using other applications (apps) compatible with our system.

## 2023-06-09 NOTE — ED ADULT NURSE NOTE - OBJECTIVE STATEMENT
36 y/o F no PMH migraines, anxiety presented to ED via EMS c/o left sided blurry vision with pressure behind left eye. Per EMS symptoms started randomly at about 1030 today, states it started with blurry vision, followed by dizziness and episodes of "knowing what she is trying to say but not finding the right words." On assessment, A&Ox4, GCS 15, PERRL, strength and sensation equal bilaterally in upper and lower extremities. Pt endorsing dizziness worsening with movement at this time. Pt denies cp, sob, abd pain, headaches, fevers, cough, recent sick contact at this time. Pt is on CM in NSR. Appropriate safety measures in place, call bell within reach, family at bedside.

## 2023-06-09 NOTE — ED ADULT TRIAGE NOTE - CHIEF COMPLAINT QUOTE
"Having difficulty getting words out" and having left sided vision changes x 2 1/2 hours. Reports "memory issues" x 2-3 days.

## 2023-06-10 VITALS
TEMPERATURE: 98 F | RESPIRATION RATE: 18 BRPM | OXYGEN SATURATION: 98 % | HEART RATE: 70 BPM | DIASTOLIC BLOOD PRESSURE: 60 MMHG | SYSTOLIC BLOOD PRESSURE: 106 MMHG

## 2023-06-10 PROCEDURE — 99239 HOSP IP/OBS DSCHRG MGMT >30: CPT

## 2023-06-10 NOTE — ED CDU PROVIDER SUBSEQUENT DAY NOTE - NS ED ROS FT
Constitutional: No fever or chills  Eyes: +visual changes  CV: No chest pain or lower extremity edema  Resp: No SOB no cough  GI: No abd pain. No nausea or vomiting. No diarrhea.   : No dysuria, hematuria.   MSK: No musculoskeletal pain  Skin: No rash  Psych: No complaints   Neuro: +headache. No numbness or tingling. No weakness. +speech changes   Endo: no known diabetes

## 2023-06-10 NOTE — ED CDU PROVIDER SUBSEQUENT DAY NOTE - ATTENDING APP SHARED VISIT CONTRIBUTION OF CARE
Attending MD Simpson:     Seen in CDU Red [facility], accompanied by     37F with PMH/PSH including complex migraines, anxiety sent to the CDU after presenting to the ED with  L sided blurry vision, headache, expressive aphasia.  Reports all symptoms for which she presented have resolved and not recurred.  Denies any other physical complaints.  Patient evaluated and feeling improved.  No acute issues at  this time.  Lab and radiology tests reviewed with patient and .  Patient instructed to follow up with her PMD Dr. Cox and bring results (printed extra copy of results as per patient request) and will follow up with Dr. Arcos for neuro.  Patient stable and eager for discharge. Follow up instructions given, importance of follow up emphasized, return to ED parameters reviewed and patient verbalized understanding.  All questions answered, all concerns addressed.     Exam:   General: NAD  HENT: head NCAT, airway patent   Chest: symmetric chest rise, no increased work of breathing  MSK: ranging neck freely  Neuro: moving all extremities spontaneously, sensory grossly intact, no gross neuro deficits  Psych: normal mood and affect.

## 2023-06-10 NOTE — ED CDU PROVIDER SUBSEQUENT DAY NOTE - HISTORY
No interval changes since initial CDU provider note. Pt without complaint. NAD VSS. no events on tele. Patient evaluated by CDU attending Dr. Simpson and cleared for discharge home. All questions answered.- JESSEE Carbone

## 2023-06-15 ENCOUNTER — APPOINTMENT (OUTPATIENT)
Dept: INTERNAL MEDICINE | Facility: CLINIC | Age: 38
End: 2023-06-15
Payer: COMMERCIAL

## 2023-06-15 VITALS
OXYGEN SATURATION: 99 % | DIASTOLIC BLOOD PRESSURE: 80 MMHG | BODY MASS INDEX: 23.78 KG/M2 | RESPIRATION RATE: 17 BRPM | HEIGHT: 66 IN | HEART RATE: 69 BPM | SYSTOLIC BLOOD PRESSURE: 120 MMHG | TEMPERATURE: 97.5 F | WEIGHT: 148 LBS

## 2023-06-15 PROCEDURE — 99214 OFFICE O/P EST MOD 30 MIN: CPT

## 2023-06-15 RX ORDER — NEOMYCIN SULFATE, POLYMYXIN B SULFATE AND DEXAMETHASONE 3.5; 10000; 1 MG/ML; [USP'U]/ML; MG/ML
3.5-10000-0.1 SUSPENSION OPHTHALMIC
Qty: 1 | Refills: 0 | Status: DISCONTINUED | COMMUNITY
Start: 2022-09-21 | End: 2023-06-15

## 2023-06-15 RX ORDER — SUMATRIPTAN 50 MG/1
50 TABLET, FILM COATED ORAL DAILY
Qty: 5 | Refills: 0 | Status: DISCONTINUED | COMMUNITY
Start: 2022-09-21 | End: 2023-06-15

## 2023-06-15 RX ORDER — CETIRIZINE HYDROCHLORIDE 10 MG/1
10 TABLET, FILM COATED ORAL
Qty: 30 | Refills: 0 | Status: DISCONTINUED | COMMUNITY
Start: 2022-08-04 | End: 2023-06-15

## 2023-06-15 RX ORDER — PANTOPRAZOLE 40 MG/1
40 TABLET, DELAYED RELEASE ORAL
Qty: 90 | Refills: 1 | Status: DISCONTINUED | COMMUNITY
Start: 2022-04-12 | End: 2023-06-15

## 2023-06-15 RX ORDER — ONDANSETRON 4 MG/1
4 TABLET, ORALLY DISINTEGRATING ORAL DAILY
Qty: 30 | Refills: 0 | Status: DISCONTINUED | COMMUNITY
Start: 2022-09-21 | End: 2023-06-15

## 2023-06-15 RX ORDER — ETONOGESTREL AND ETHINYL ESTRADIOL .12; .015 MG/D; MG/D
0.12-0.015 RING VAGINAL
Qty: 1 | Refills: 5 | Status: DISCONTINUED | COMMUNITY
Start: 2023-02-21 | End: 2023-06-15

## 2023-06-16 NOTE — HISTORY OF PRESENT ILLNESS
[FreeTextEntry1] : f/u from ER visit- headache [de-identified] : last fri- had headache, left eye blurry vision. diff. speaking. went to Er, dxd with ocular migraine. feels fine now. cta head and neck neg.

## 2023-06-16 NOTE — ASSESSMENT
[FreeTextEntry1] : likely migraine - ocular migraine \par neuro consult to r/o other etx. \par headache journal\par consistent lifestyle- sleep, nutrition, exercise. \par cant take magnesium 400mg qd. \par maintain well hydrated. \par Total time 30 min ( 20 min. FTF and 10 min chart review and documentation.)\par

## 2023-08-04 ENCOUNTER — APPOINTMENT (OUTPATIENT)
Dept: OBGYN | Facility: CLINIC | Age: 38
End: 2023-08-04
Payer: COMMERCIAL

## 2023-08-04 VITALS — SYSTOLIC BLOOD PRESSURE: 111 MMHG | BODY MASS INDEX: 23.24 KG/M2 | DIASTOLIC BLOOD PRESSURE: 75 MMHG | WEIGHT: 144 LBS

## 2023-08-04 DIAGNOSIS — Z30.430 ENCOUNTER FOR INSERTION OF INTRAUTERINE CONTRACEPTIVE DEVICE: ICD-10-CM

## 2023-08-04 DIAGNOSIS — Z00.00 ENCOUNTER FOR GENERAL ADULT MEDICAL EXAMINATION W/OUT ABNORMAL FINDINGS: ICD-10-CM

## 2023-08-04 LAB — HCG UR QL: NEGATIVE

## 2023-08-04 PROCEDURE — 76830 TRANSVAGINAL US NON-OB: CPT

## 2023-08-04 PROCEDURE — 81025 URINE PREGNANCY TEST: CPT

## 2023-08-04 PROCEDURE — 99395 PREV VISIT EST AGE 18-39: CPT | Mod: 25

## 2023-08-04 PROCEDURE — 58300 INSERT INTRAUTERINE DEVICE: CPT

## 2023-08-04 NOTE — PROCEDURE
[Locate IUD] : locate IUD [Transvaginal Ultrasound] : transvaginal ultrasound [IUD Placement] : intrauterine device (IUD) placement [Time out performed] : Pre-procedure time out performed.  Patient's name, date of birth and procedure confirmed. [Consent Obtained] : Consent obtained [Prevention of Pregnancy] : prevention of pregnancy [Risks] : risks [Benefits] : benefits [Alternatives] : alternatives [Patient] : patient [Infection] : infection [Bleeding] : bleeding [Pain] : pain [Expulsion] : expulsion [Failure] : failure [Uterine Perforation] : uterine perforation [Neg Pregnancy Test] : negative pregnancy test [No Premedication] : No premedication [Tenaculum] : Tenaculum [Easy Passage] : Easy passage [ParaGard IUD] : ParaGard IUD [Tolerated Well] : Patient tolerated the procedure well [No Complications] : No complications [None] : None [FreeTextEntry3] : IUD in ee cavity

## 2023-08-04 NOTE — HISTORY OF PRESENT ILLNESS
[FreeTextEntry1] : 38yo  here for annual and desires IUD. no complaints.  pmh denies psh denies meds none all nkda  obhx nsd 5, 9-3lbs gynhx unremakralbe, (+) maternal aunt breast cancer 49yo; follows at [facility] for fibroadenoma annual mammos

## 2023-08-06 LAB
C TRACH RRNA SPEC QL NAA+PROBE: NOT DETECTED
N GONORRHOEA RRNA SPEC QL NAA+PROBE: NOT DETECTED
SOURCE AMPLIFICATION: NORMAL

## 2023-08-29 ENCOUNTER — APPOINTMENT (OUTPATIENT)
Dept: OBGYN | Facility: CLINIC | Age: 38
End: 2023-08-29
Payer: COMMERCIAL

## 2023-08-29 VITALS — SYSTOLIC BLOOD PRESSURE: 100 MMHG | BODY MASS INDEX: 23.73 KG/M2 | DIASTOLIC BLOOD PRESSURE: 66 MMHG | WEIGHT: 147 LBS

## 2023-08-29 DIAGNOSIS — Z97.5 PRESENCE OF (INTRAUTERINE) CONTRACEPTIVE DEVICE: ICD-10-CM

## 2023-08-29 PROCEDURE — 99212 OFFICE O/P EST SF 10 MIN: CPT

## 2023-08-29 PROCEDURE — 81003 URINALYSIS AUTO W/O SCOPE: CPT | Mod: QW

## 2023-08-29 PROCEDURE — 81025 URINE PREGNANCY TEST: CPT

## 2023-08-29 PROCEDURE — 76830 TRANSVAGINAL US NON-OB: CPT

## 2023-09-01 LAB
BILIRUB UR QL STRIP: NORMAL
GLUCOSE UR-MCNC: NORMAL
HCG UR QL: 1 EU/DL
HCG UR QL: NEGATIVE
HGB UR QL STRIP.AUTO: NORMAL
KETONES UR-MCNC: NORMAL
LEUKOCYTE ESTERASE UR QL STRIP: NORMAL
NITRITE UR QL STRIP: NORMAL
PH UR STRIP: 6
PROT UR STRIP-MCNC: NORMAL
SP GR UR STRIP: 1.02

## 2023-09-05 NOTE — PROCEDURE
[Locate IUD] : locate IUD [Transvaginal Ultrasound] : transvaginal ultrasound [Anteverted] : anteverted [Not Visualized] : not visualized [FreeTextEntry5] : IUD seen in EE normal position tip in fundal region  [FreeTextEntry4] : ParaGard in situ

## 2023-09-05 NOTE — HISTORY OF PRESENT ILLNESS
[FreeTextEntry1] : Pt is a , LMP now, here for f/u ParaGard insertion 8/4, has expelled befoire, has period now   pmh denies psh denies meds none all nkda  obhx nsd 5, 9-3lbs gynhx unremakralbe, (+) maternal aunt breast cancer 49yo; follows at [facility] for fibroadenoma annual mammos
no joint swelling/no joint erythema/no joint warmth/no calf tenderness

## 2023-10-30 NOTE — ASU DISCHARGE PLAN (ADULT/PEDIATRIC) - BATHING
Shower only Render Note In Bullet Format When Appropriate: No Application Tool (Optional): Liquid Nitrogen Sprayer Post-Care Instructions: I reviewed with the patient in detail post-care instructions. Patient is to wear sunprotection, and avoid picking at any of the treated lesions. Pt may apply Vaseline to crusted or scabbing areas. Duration Of Freeze Thaw-Cycle (Seconds): 5 Number Of Freeze-Thaw Cycles: 2 freeze-thaw cycles Render Post-Care Instructions In Note?: yes Consent: The patient's consent was obtained including but not limited to risks of crusting, scabbing, blistering, scarring, darker or lighter pigmentary change, recurrence, incomplete removal and infection. Detail Level: Detailed Aperture Size (Optional): C

## 2023-10-31 ENCOUNTER — APPOINTMENT (OUTPATIENT)
Dept: OBGYN | Facility: CLINIC | Age: 38
End: 2023-10-31
Payer: COMMERCIAL

## 2023-10-31 VITALS — SYSTOLIC BLOOD PRESSURE: 118 MMHG | WEIGHT: 143 LBS | DIASTOLIC BLOOD PRESSURE: 72 MMHG | BODY MASS INDEX: 23.08 KG/M2

## 2023-10-31 DIAGNOSIS — R10.9 UNSPECIFIED ABDOMINAL PAIN: ICD-10-CM

## 2023-10-31 DIAGNOSIS — N92.6 IRREGULAR MENSTRUATION, UNSPECIFIED: ICD-10-CM

## 2023-10-31 DIAGNOSIS — Z30.40 ENCOUNTER FOR SURVEILLANCE OF CONTRACEPTIVES, UNSPECIFIED: ICD-10-CM

## 2023-10-31 LAB — HCG UR QL: NEGATIVE

## 2023-10-31 PROCEDURE — 81025 URINE PREGNANCY TEST: CPT

## 2023-10-31 PROCEDURE — 76830 TRANSVAGINAL US NON-OB: CPT

## 2023-10-31 PROCEDURE — 99213 OFFICE O/P EST LOW 20 MIN: CPT

## 2023-11-02 ENCOUNTER — APPOINTMENT (OUTPATIENT)
Dept: PAIN MANAGEMENT | Facility: CLINIC | Age: 38
End: 2023-11-02
Payer: COMMERCIAL

## 2023-11-02 VITALS
HEIGHT: 66 IN | HEART RATE: 76 BPM | SYSTOLIC BLOOD PRESSURE: 106 MMHG | DIASTOLIC BLOOD PRESSURE: 70 MMHG | WEIGHT: 145 LBS | BODY MASS INDEX: 23.3 KG/M2

## 2023-11-02 DIAGNOSIS — G43.909 MIGRAINE, UNSPECIFIED, NOT INTRACTABLE, W/OUT STATUS MIGRAINOSUS: ICD-10-CM

## 2023-11-02 DIAGNOSIS — R41.89 OTHER SYMPTOMS AND SIGNS INVOLVING COGNITIVE FUNCTIONS AND AWARENESS: ICD-10-CM

## 2023-11-02 PROCEDURE — 99204 OFFICE O/P NEW MOD 45 MIN: CPT

## 2023-11-02 RX ORDER — RIZATRIPTAN BENZOATE 10 MG/1
10 TABLET, ORALLY DISINTEGRATING ORAL
Qty: 18 | Refills: 3 | Status: ACTIVE | COMMUNITY
Start: 2023-11-02 | End: 1900-01-01

## 2023-11-21 PROBLEM — G43.909 MIGRAINE: Status: ACTIVE | Noted: 2022-09-21

## 2023-11-21 PROBLEM — R41.89 COGNITIVE CHANGE: Status: ACTIVE | Noted: 2023-11-21

## 2023-12-21 DIAGNOSIS — N76.0 ACUTE VAGINITIS: ICD-10-CM

## 2023-12-21 RX ORDER — CLOTRIMAZOLE AND BETAMETHASONE DIPROPIONATE 10; .5 MG/G; MG/G
1-0.05 CREAM TOPICAL TWICE DAILY
Qty: 1 | Refills: 0 | Status: ACTIVE | COMMUNITY
Start: 2023-12-21 | End: 1900-01-01

## 2023-12-21 RX ORDER — FLUCONAZOLE 150 MG/1
150 TABLET ORAL DAILY
Qty: 3 | Refills: 0 | Status: ACTIVE | COMMUNITY
Start: 2023-12-21 | End: 1900-01-01

## 2023-12-26 RX ORDER — FLUCONAZOLE 150 MG/1
150 TABLET ORAL
Qty: 2 | Refills: 0 | Status: ACTIVE | COMMUNITY
Start: 2023-12-26 | End: 1900-01-01

## 2024-01-28 NOTE — DISCHARGE NOTE OB - ADDITIONAL INSTRUCTIONS
Nothing in the vagina for 6 weeks (no tampons, no intercourse, no douching). No swimming pools/tub baths. You may shower. If you have a fever >100.4F, heavy vaginal bleeding or severe abdominal pain despite medication, please call your doctor or go to the emergency room.  Follow up with your doctor in 6 weeks for a postpartum check.
0 (no pain/absence of nonverbal indicators of pain)

## 2024-02-05 ENCOUNTER — APPOINTMENT (OUTPATIENT)
Dept: PAIN MANAGEMENT | Facility: CLINIC | Age: 39
End: 2024-02-05
Payer: COMMERCIAL

## 2024-02-05 VITALS
WEIGHT: 145 LBS | SYSTOLIC BLOOD PRESSURE: 104 MMHG | DIASTOLIC BLOOD PRESSURE: 69 MMHG | BODY MASS INDEX: 23.3 KG/M2 | HEART RATE: 64 BPM | HEIGHT: 66 IN

## 2024-02-05 DIAGNOSIS — G43.009 MIGRAINE W/OUT AURA, NOT INTRACTABLE, W/OUT STATUS MIGRAINOSUS: ICD-10-CM

## 2024-02-05 PROCEDURE — 99213 OFFICE O/P EST LOW 20 MIN: CPT

## 2024-02-05 RX ORDER — ELETRIPTAN HYDROBROMIDE 40 MG/1
40 TABLET, FILM COATED ORAL
Qty: 12 | Refills: 2 | Status: ACTIVE | COMMUNITY
Start: 2024-02-05 | End: 1900-01-01

## 2024-02-05 NOTE — HISTORY OF PRESENT ILLNESS
[FreeTextEntry1] : Pt returns today for a follow up appt.  Has a migraine 1-3x/ year.  Did try Rizatriptan - it was not helpful. Pt did take a second dose.  Did have a sample of Ubrelvy and did find it helpful to abort migraine.  Pt denies any new migraine symptoms.  Pt denies pregnancy.  [Headache] : headache [Nausea] : nausea [Photophobia] : photophobia [Phonophobia] : phonophobia [Scalp Tenderness] : scalp tenderness [> 4 hours] : > 4 hours [Stable] : The patient reports ~his/her~ symptoms since the last visit are stable

## 2024-02-05 NOTE — ASSESSMENT
[FreeTextEntry1] : Episodic migraine                                                                                  Episodic migraine  trial of Eletriptan Trial of Ubrelvy   RTO 3 months              trial

## 2024-02-05 NOTE — PHYSICAL EXAM
[General Appearance - Alert] : alert [General Appearance - Well Nourished] : well nourished [General Appearance - Well-Appearing] : healthy appearing [Oriented To Time, Place, And Person] : oriented to person, place, and time [Impaired Insight] : insight and judgment were intact [Affect] : the affect was normal [Memory Recent] : recent memory was not impaired [Memory Remote] : remote memory was not impaired [Person] : oriented to person [Place] : oriented to place [Time] : oriented to time [Short Term Intact] : short term memory intact [Remote Intact] : remote memory intact [Registration Intact] : recent registration memory intact [Concentration Intact] : normal concentrating ability [Visual Intact] : visual attention was ~T not ~L decreased [Writing A Sentence] : no difficulty writing a sentence [Fluency] : fluency intact [Comprehension] : comprehension intact [Reading] : reading intact [Current Events] : adequate knowledge of current events [Past History] : adequate knowledge of personal past history [Vocabulary] : adequate range of vocabulary [Cranial Nerves Oculomotor (III)] : extraocular motion intact [Cranial Nerves Facial (VII)] : face symmetrical [Cranial Nerves Vestibulocochlear (VIII)] : hearing was intact bilaterally [Cranial Nerves Accessory (XI - Cranial And Spinal)] : head turning and shoulder shrug symmetric [Cranial Nerves Hypoglossal (XII)] : there was no tongue deviation with protrusion [No Muscle Atrophy] : normal bulk in all four extremities [Motor Handedness Right-Handed] : the patient is right hand dominant [Motor Strength Upper Extremities Bilaterally] : strength was normal in both upper extremities [Abnormal Walk] : normal gait [Tremor] : no tremor present [Dysdiadochokinesia Bilaterally] : not present [Sclera] : the sclera and conjunctiva were normal [No AYO] : no internuclear ophthalmoplegia [Strabismus] : no strabismus was seen [Outer Ear] : the ears and nose were normal in appearance [Neck Appearance] : the appearance of the neck was normal [Neck Cervical Mass (___cm)] : no neck mass was observed [Exaggerated Use Of Accessory Muscles For Inspiration] : no accessory muscle use [Nail Clubbing] : no clubbing  or cyanosis of the fingernails [Involuntary Movements] : no involuntary movements were seen [Skin Color & Pigmentation] : normal skin color and pigmentation [] : no rash

## 2024-02-05 NOTE — REVIEW OF SYSTEMS
[Fever] : no fever [Feeling Poorly] : not feeling poorly [Feeling Tired] : feeling tired [Eyesight Problems] : no eyesight problems [Nasal Discharge] : no nasal discharge [Cough] : no cough [Constipation] : no constipation [Arthralgias] : arthralgias [Skin Lesions] : no skin lesions [Itching] : no itching [Confused] : confusion [Convulsions] : no convulsions [As Noted in HPI] : as noted in HPI [Sleep Disturbances] : sleep disturbances [Anxiety] : anxiety [Muscle Weakness] : no muscle weakness

## 2024-03-08 RX ORDER — UBROGEPANT 100 MG/1
100 TABLET ORAL
Qty: 1 | Refills: 2 | Status: ACTIVE | COMMUNITY
Start: 2024-02-05

## 2024-04-26 ENCOUNTER — RX RENEWAL (OUTPATIENT)
Age: 39
End: 2024-04-26

## 2024-04-26 RX ORDER — PANTOPRAZOLE 40 MG/1
40 TABLET, DELAYED RELEASE ORAL
Qty: 30 | Refills: 3 | Status: ACTIVE | COMMUNITY
Start: 2023-03-14 | End: 1900-01-01

## 2024-05-07 ENCOUNTER — APPOINTMENT (OUTPATIENT)
Dept: PAIN MANAGEMENT | Facility: CLINIC | Age: 39
End: 2024-05-07

## 2024-08-23 NOTE — OB PROVIDER H&P - NSANTENATALSTERA_OBGYN_ALL_OB
Chronic complaint, laboratory testing stable, patient frustrated had been through physical therapy in the past 6 months, had been to ENT physician multiple neurologist, seen cardiologist as well, had evaluation for patent foramen ovale which they did not think was significant, and history of cerebrovascular injury, has follow-up with neurologist in October.   Not applicable as gestational age is greater than or equal to 34 weeks.

## 2024-09-16 ENCOUNTER — APPOINTMENT (OUTPATIENT)
Dept: OBGYN | Facility: CLINIC | Age: 39
End: 2024-09-16

## 2024-09-24 DIAGNOSIS — N76.0 ACUTE VAGINITIS: ICD-10-CM

## 2024-09-24 RX ORDER — TERCONAZOLE 8 MG/G
0.8 CREAM VAGINAL
Qty: 1 | Refills: 0 | Status: ACTIVE | COMMUNITY
Start: 2024-09-24 | End: 1900-01-01

## 2024-09-30 ENCOUNTER — APPOINTMENT (OUTPATIENT)
Dept: OBGYN | Facility: CLINIC | Age: 39
End: 2024-09-30
Payer: COMMERCIAL

## 2024-09-30 ENCOUNTER — LABORATORY RESULT (OUTPATIENT)
Age: 39
End: 2024-09-30

## 2024-09-30 VITALS
HEART RATE: 72 BPM | HEIGHT: 66 IN | BODY MASS INDEX: 23.3 KG/M2 | WEIGHT: 145 LBS | DIASTOLIC BLOOD PRESSURE: 72 MMHG | SYSTOLIC BLOOD PRESSURE: 123 MMHG

## 2024-09-30 DIAGNOSIS — Z30.432 ENCOUNTER FOR REMOVAL OF INTRAUTERINE CONTRACEPTIVE DEVICE: ICD-10-CM

## 2024-09-30 PROCEDURE — 99395 PREV VISIT EST AGE 18-39: CPT

## 2024-09-30 PROCEDURE — 99213 OFFICE O/P EST LOW 20 MIN: CPT | Mod: 25

## 2024-09-30 PROCEDURE — 76830 TRANSVAGINAL US NON-OB: CPT

## 2024-09-30 PROCEDURE — 99459 PELVIC EXAMINATION: CPT

## 2024-09-30 RX ORDER — CLOTRIMAZOLE AND BETAMETHASONE DIPROPIONATE 10; .5 MG/G; MG/G
1-0.05 CREAM TOPICAL TWICE DAILY
Qty: 1 | Refills: 0 | Status: ACTIVE | COMMUNITY
Start: 2024-09-30 | End: 1900-01-01

## 2024-09-30 NOTE — HISTORY OF PRESENT ILLNESS
[FreeTextEntry1] : 38yo  here for annual. reports symptoms of vulvovaginitis as well. no f/cn/v. no dsicarhge just pruritis. here also for discussion regarding IUD mgmt. deciding on removal, extensive counseling on contraception had about all options, pt opted to keep IUD.  pmh denies psh denies meds none all nkda  obhx nsd 5, 9-3lbs gynhx unremakralbe, (+) maternal aunt breast cancer 49yo; follows at [facility] for fibroadenoma annual mammos, last pap 2021 NILM

## 2024-09-30 NOTE — PROCEDURE
[IUD Removal] : intrauterine device (IUD) removal [Fertility Desired] : fertility desired [Risks] : risks [Benefits] : benefits [Alternatives] : alternatives [Patient] : patient [Speculum Placed] : speculum placed [Strings Visualized] : strings visualized [IUD Discarded] : IUD discarded [Heavy Vaginal Bleeding] : for heavy vaginal bleeding [Cervical Pap Smear] : cervical Pap smear [Liquid Base] : liquid base [GC & Chlamydia via Pap] : GC & Chlamydia via Pap [Tolerated Well] : the patient tolerated the procedure well [No Complications] : there were no complications [Locate IUD] : locate IUD [Transvaginal Ultrasound] : transvaginal ultrasound [FreeTextEntry3] : IUD in ee cavity

## 2024-09-30 NOTE — PHYSICAL EXAM
[Chaperone Present] : A chaperone was present in the examining room during all aspects of the physical examination [37215] : A chaperone was present during the pelvic exam. [Appropriately responsive] : appropriately responsive [Alert] : alert [No Acute Distress] : no acute distress [No Lymphadenopathy] : no lymphadenopathy [Regular Rate Rhythm] : regular rate rhythm [No Murmurs] : no murmurs [Clear to Auscultation B/L] : clear to auscultation bilaterally [Soft] : soft [Non-tender] : non-tender [Non-distended] : non-distended [No HSM] : No HSM [No Lesions] : no lesions [No Mass] : no mass [Oriented x3] : oriented x3 [Examination Of The Breasts] : a normal appearance [No Masses] : no breast masses were palpable [Labia Majora] : normal [Labia Minora] : normal [Normal] : normal [Uterine Adnexae] : normal

## 2024-09-30 NOTE — PHYSICAL EXAM
[Chaperone Present] : A chaperone was present in the examining room during all aspects of the physical examination [70321] : A chaperone was present during the pelvic exam. [Appropriately responsive] : appropriately responsive [Alert] : alert [No Acute Distress] : no acute distress [No Lymphadenopathy] : no lymphadenopathy [Regular Rate Rhythm] : regular rate rhythm [No Murmurs] : no murmurs [Clear to Auscultation B/L] : clear to auscultation bilaterally [Soft] : soft [Non-tender] : non-tender [Non-distended] : non-distended [No HSM] : No HSM [No Lesions] : no lesions [No Mass] : no mass [Oriented x3] : oriented x3 [Examination Of The Breasts] : a normal appearance [No Masses] : no breast masses were palpable [Labia Majora] : normal [Labia Minora] : normal [Normal] : normal [Uterine Adnexae] : normal

## 2024-09-30 NOTE — HISTORY OF PRESENT ILLNESS
[FreeTextEntry1] : 40yo  here for annual. reports symptoms of vulvovaginitis as well. no f/cn/v. no dsicarhge just pruritis. here also for discussion regarding IUD mgmt. deciding on removal, extensive counseling on contraception had about all options, pt opted to keep IUD.  pmh denies psh denies meds none all nkda  obhx nsd 5, 9-3lbs gynhx unremakralbe, (+) maternal aunt breast cancer 47yo; follows at [facility] for fibroadenoma annual mammos, last pap 2021 NILM

## 2024-10-01 LAB
C TRACH RRNA SPEC QL NAA+PROBE: NOT DETECTED
HPV HIGH+LOW RISK DNA PNL CVX: NOT DETECTED
N GONORRHOEA RRNA SPEC QL NAA+PROBE: NOT DETECTED
SOURCE TP AMPLIFICATION: NORMAL

## 2024-10-06 LAB — CYTOLOGY CVX/VAG DOC THIN PREP: NORMAL

## 2024-11-07 ENCOUNTER — APPOINTMENT (OUTPATIENT)
Dept: INTERNAL MEDICINE | Facility: CLINIC | Age: 39
End: 2024-11-07
Payer: COMMERCIAL

## 2024-11-07 ENCOUNTER — LABORATORY RESULT (OUTPATIENT)
Age: 39
End: 2024-11-07

## 2024-11-07 ENCOUNTER — NON-APPOINTMENT (OUTPATIENT)
Age: 39
End: 2024-11-07

## 2024-11-07 VITALS
DIASTOLIC BLOOD PRESSURE: 70 MMHG | SYSTOLIC BLOOD PRESSURE: 100 MMHG | RESPIRATION RATE: 17 BRPM | TEMPERATURE: 97.8 F | WEIGHT: 144 LBS | BODY MASS INDEX: 23.14 KG/M2 | OXYGEN SATURATION: 100 % | HEART RATE: 72 BPM | HEIGHT: 66 IN

## 2024-11-07 DIAGNOSIS — R10.10 UPPER ABDOMINAL PAIN, UNSPECIFIED: ICD-10-CM

## 2024-11-07 DIAGNOSIS — K21.9 GASTRO-ESOPHAGEAL REFLUX DISEASE W/OUT ESOPHAGITIS: ICD-10-CM

## 2024-11-07 DIAGNOSIS — R41.89 OTHER SYMPTOMS AND SIGNS INVOLVING COGNITIVE FUNCTIONS AND AWARENESS: ICD-10-CM

## 2024-11-07 DIAGNOSIS — G43.909 MIGRAINE, UNSPECIFIED, NOT INTRACTABLE, W/OUT STATUS MIGRAINOSUS: ICD-10-CM

## 2024-11-07 DIAGNOSIS — Z00.00 ENCOUNTER FOR GENERAL ADULT MEDICAL EXAMINATION W/OUT ABNORMAL FINDINGS: ICD-10-CM

## 2024-11-07 DIAGNOSIS — F41.9 ANXIETY DISORDER, UNSPECIFIED: ICD-10-CM

## 2024-11-07 PROBLEM — R94.31 ABNORMAL EKG: Status: ACTIVE | Noted: 2024-11-07

## 2024-11-07 PROCEDURE — 99214 OFFICE O/P EST MOD 30 MIN: CPT | Mod: 25

## 2024-11-07 PROCEDURE — 93000 ELECTROCARDIOGRAM COMPLETE: CPT

## 2024-11-07 PROCEDURE — 99395 PREV VISIT EST AGE 18-39: CPT

## 2024-11-09 LAB
ALBUMIN SERPL ELPH-MCNC: 4.4 G/DL
ALP BLD-CCNC: 35 U/L
ALT SERPL-CCNC: 10 U/L
ANION GAP SERPL CALC-SCNC: 10 MMOL/L
APPEARANCE: CLEAR
AST SERPL-CCNC: 13 U/L
BASOPHILS # BLD AUTO: 0.03 K/UL
BASOPHILS NFR BLD AUTO: 0.5 %
BILIRUB SERPL-MCNC: 0.4 MG/DL
BILIRUBIN URINE: NEGATIVE
BLOOD URINE: NEGATIVE
BUN SERPL-MCNC: 19 MG/DL
CALCIUM SERPL-MCNC: 9.2 MG/DL
CHLORIDE SERPL-SCNC: 102 MMOL/L
CHOLEST SERPL-MCNC: 188 MG/DL
CO2 SERPL-SCNC: 25 MMOL/L
COLOR: YELLOW
CREAT SERPL-MCNC: 0.96 MG/DL
EGFR: 77 ML/MIN/1.73M2
EOSINOPHIL # BLD AUTO: 0.14 K/UL
EOSINOPHIL NFR BLD AUTO: 2.4 %
ESTIMATED AVERAGE GLUCOSE: 105 MG/DL
FERRITIN SERPL-MCNC: 5 NG/ML
GLUCOSE QUALITATIVE U: NEGATIVE MG/DL
GLUCOSE SERPL-MCNC: 95 MG/DL
HBA1C MFR BLD HPLC: 5.3 %
HCT VFR BLD CALC: 37.5 %
HDLC SERPL-MCNC: 60 MG/DL
HGB BLD-MCNC: 11.1 G/DL
IMM GRANULOCYTES NFR BLD AUTO: 0.2 %
IRON SERPL-MCNC: 21 UG/DL
KETONES URINE: NEGATIVE MG/DL
LDLC SERPL CALC-MCNC: 116 MG/DL
LEUKOCYTE ESTERASE URINE: ABNORMAL
LYMPHOCYTES # BLD AUTO: 1.75 K/UL
LYMPHOCYTES NFR BLD AUTO: 30.5 %
MAN DIFF?: NORMAL
MCHC RBC-ENTMCNC: 25.5 PG
MCHC RBC-ENTMCNC: 29.6 G/DL
MCV RBC AUTO: 86 FL
MONOCYTES # BLD AUTO: 0.52 K/UL
MONOCYTES NFR BLD AUTO: 9.1 %
NEUTROPHILS # BLD AUTO: 3.29 K/UL
NEUTROPHILS NFR BLD AUTO: 57.3 %
NITRITE URINE: NEGATIVE
NONHDLC SERPL-MCNC: 128 MG/DL
PH URINE: 6
PLATELET # BLD AUTO: 300 K/UL
POTASSIUM SERPL-SCNC: 4.2 MMOL/L
PROT SERPL-MCNC: 7.3 G/DL
PROTEIN URINE: NEGATIVE MG/DL
RBC # BLD: 4.36 M/UL
RBC # FLD: 15.9 %
SODIUM SERPL-SCNC: 137 MMOL/L
SPECIFIC GRAVITY URINE: 1.03
TRIGL SERPL-MCNC: 67 MG/DL
TSH SERPL-ACNC: 1.03 UIU/ML
UROBILINOGEN URINE: 0.2 MG/DL
VIT B12 SERPL-MCNC: 485 PG/ML
WBC # FLD AUTO: 5.74 K/UL

## 2024-11-12 ENCOUNTER — APPOINTMENT (OUTPATIENT)
Dept: INTERNAL MEDICINE | Facility: CLINIC | Age: 39
End: 2024-11-12
Payer: COMMERCIAL

## 2024-11-12 VITALS
TEMPERATURE: 97.6 F | SYSTOLIC BLOOD PRESSURE: 90 MMHG | OXYGEN SATURATION: 98 % | DIASTOLIC BLOOD PRESSURE: 60 MMHG | HEART RATE: 74 BPM | BODY MASS INDEX: 23.14 KG/M2 | WEIGHT: 144 LBS | RESPIRATION RATE: 17 BRPM | HEIGHT: 66 IN

## 2024-11-12 DIAGNOSIS — R42 DIZZINESS AND GIDDINESS: ICD-10-CM

## 2024-11-12 DIAGNOSIS — K58.9 IRRITABLE BOWEL SYNDROME, UNSPECIFIED: ICD-10-CM

## 2024-11-12 DIAGNOSIS — D64.9 ANEMIA, UNSPECIFIED: ICD-10-CM

## 2024-11-12 DIAGNOSIS — J06.9 ACUTE UPPER RESPIRATORY INFECTION, UNSPECIFIED: ICD-10-CM

## 2024-11-12 DIAGNOSIS — R94.31 ABNORMAL ELECTROCARDIOGRAM [ECG] [EKG]: ICD-10-CM

## 2024-11-12 PROCEDURE — 99214 OFFICE O/P EST MOD 30 MIN: CPT

## 2024-11-12 PROCEDURE — G2211 COMPLEX E/M VISIT ADD ON: CPT

## 2025-01-22 ENCOUNTER — APPOINTMENT (OUTPATIENT)
Dept: OBGYN | Facility: CLINIC | Age: 40
End: 2025-01-22
Payer: COMMERCIAL

## 2025-01-22 VITALS
HEART RATE: 80 BPM | WEIGHT: 134 LBS | DIASTOLIC BLOOD PRESSURE: 77 MMHG | HEIGHT: 66 IN | BODY MASS INDEX: 21.53 KG/M2 | SYSTOLIC BLOOD PRESSURE: 131 MMHG

## 2025-01-22 DIAGNOSIS — Z30.432 ENCOUNTER FOR REMOVAL OF INTRAUTERINE CONTRACEPTIVE DEVICE: ICD-10-CM

## 2025-01-22 LAB
BILIRUB UR QL STRIP: NORMAL
GLUCOSE UR-MCNC: NORMAL
HCG UR QL: 0.2 EU/DL
HCG UR QL: NEGATIVE
HGB UR QL STRIP.AUTO: NORMAL
KETONES UR-MCNC: NORMAL
LEUKOCYTE ESTERASE UR QL STRIP: NORMAL
NITRITE UR QL STRIP: NORMAL
PH UR STRIP: 6
PROT UR STRIP-MCNC: NORMAL
SP GR UR STRIP: >=1.03

## 2025-01-22 PROCEDURE — 81025 URINE PREGNANCY TEST: CPT

## 2025-01-22 PROCEDURE — 81003 URINALYSIS AUTO W/O SCOPE: CPT | Mod: QW

## 2025-01-22 PROCEDURE — 58301 REMOVE INTRAUTERINE DEVICE: CPT

## 2025-01-28 LAB — ACTINOMYCES CULTURE FOR IUD: NORMAL

## 2025-02-05 DIAGNOSIS — Z29.89 ENCOUNTER. FOR OTHER SPECIFIED PROPHYLACTIC MEASURES: ICD-10-CM

## 2025-02-05 RX ORDER — ACETAZOLAMIDE 125 MG/1
125 TABLET ORAL
Qty: 6 | Refills: 0 | Status: ACTIVE | COMMUNITY
Start: 2025-02-05 | End: 1900-01-01

## 2025-03-13 NOTE — OB RN PATIENT PROFILE - PRO PRENATAL LABS ORI SOURCE VDRL/RPR
acetaminophen 325 mg oral tablet: 2 tab(s) orally every 8 hours  atorvastatin 40 mg oral tablet: 1 tab(s) orally once a day (at bedtime)  bisacodyl 10 mg rectal suppository: 1 suppository(ies) rectal once a day As needed Constipation  cefTRIAXone 2 g injection: 2 gram(s) intravenously every 24 hours In 50mL of dextrose 5%, IV intermittent via PICC line, infuse over 30 minutes every 24 hours with end date of 4/7/25; weekly CBC, CMP, CRP, ESR to be reported to Dr. Rosas Daniels, Address: [address] [place] [address], [place] [address]; Phone: [phone], Fax [phone]  cyclobenzaprine 5 mg oral tablet: 1 tab(s) orally 3 times a day As needed Muscle Spasm  Eliquis 5 mg oral tablet: 1 tab(s) orally 2 times a day  ferrous sulfate 325 mg (65 mg elemental iron) oral tablet: 1 tab(s) orally once a day  finasteride 1 mg oral tablet: 1 tab(s) orally once a day  furosemide 40 mg oral tablet: 1 tab(s) orally once a day  levothyroxine 150 mcg (0.15 mg) oral tablet: 1 tab(s) orally once a day  melatonin 5 mg oral tablet: 1 tab(s) orally once a day (at bedtime)  Multiple Vitamins oral tablet: 1 tab(s) orally once a day  pantoprazole 40 mg oral delayed release tablet: 1 tab(s) orally once a day (before a meal)  polyethylene glycol 3350 oral powder for reconstitution: 17 gram(s) orally once a day  senna leaf extract oral tablet: 2 tab(s) orally once a day (at bedtime)  spironolactone 25 mg oral tablet: 1 tab(s) orally once a day  traMADol 50 mg oral tablet: 1 tab(s) orally every 6 hours As needed Severe Pain (7 - 10)  traMADol 50 mg oral tablet: 0.5 tab(s) orally every 6 hours As needed Moderate Pain (4 - 6)   acetaminophen 325 mg oral tablet: 2 tab(s) orally every 8 hours  ammonium lactate 12% topical lotion: Apply topically to affected area 2 times a day  apixaban 5 mg oral tablet: 1 tab(s) orally 2 times a day  atorvastatin 40 mg oral tablet: 1 tab(s) orally once a day (at bedtime)  bisacodyl 10 mg rectal suppository: 1 suppository(ies) rectal once a day as needed for Constipation  cefTRIAXone 2 g injection: 2 gram(s) intravenously every 24 hours In 50mL of dextrose 5%, IV intermittent via PICC line, infuse over 30 minutes every 24 hours with end date of 4/7/25; weekly CBC, CMP, CRP, ESR to be reported to Dr. Fabio Marshall MD, Infectious disease; Address: [address] [place] [address];  Phone: [phone]  chlorhexidine 4% topical liquid: Apply topically to affected area once a day  cyclobenzaprine 5 mg oral tablet: 1 tab(s) orally 3 times a day as needed for Muscle Spasm  ferrous sulfate 325 mg (65 mg elemental iron) oral tablet: 1 tab(s) orally once a day  furosemide 40 mg oral tablet: 1 tab(s) orally once a day  levothyroxine 150 mcg (0.15 mg) oral tablet: 1 tab(s) orally once a day  lidocaine 4% topical film: Apply topically to affected area once a day 12 hours on and 12 hours off  melatonin 3 mg oral tablet: 2 tab(s) orally once a day (at bedtime)  Multiple Vitamins oral tablet: 1 tab(s) orally once a day  pantoprazole 40 mg oral delayed release tablet: 1 tab(s) orally once a day (before a meal)  petrolatum topical ointment: 1 Apply topically to affected area 3 times a day  polyethylene glycol 3350 oral powder for reconstitution: 17 gram(s) orally once a day  senna leaf extract oral tablet: 2 tab(s) orally once a day (at bedtime)  spironolactone 25 mg oral tablet: 1 tab(s) orally once a day  traMADol 50 mg oral tablet: 1 tab(s) orally every 4 hours as needed for Severe Pain (7 - 10) MDD: 300mg  zolpidem 10 mg oral tablet: 1 tab(s) orally once a day (at bedtime) MDD: 10mg   acetaminophen 325 mg oral tablet: 2 tab(s) orally every 8 hours  ammonium lactate 12% topical lotion: Apply topically to affected area 2 times a day  apixaban 5 mg oral tablet: 1 tab(s) orally 2 times a day  atorvastatin 40 mg oral tablet: 1 tab(s) orally once a day (at bedtime)  bisacodyl 10 mg rectal suppository: 1 suppository(ies) rectal once a day as needed for Constipation  bisacodyl 5 mg oral delayed release tablet: 1 tab(s) orally once a day (at bedtime)  cefTRIAXone 2 g injection: 2 gram(s) intravenously every 24 hours In 50mL of dextrose 5%, IV intermittent via PICC line, infuse over 30 minutes every 24 hours with end date of 4/7/25; weekly CBC, CMP, CRP, ESR to be reported to Dr. Fabio Marshall MD, Infectious disease; Address: [address] [place] [address];  Phone: [phone]  chlorhexidine 4% topical liquid: Apply topically to affected area once a day  cyclobenzaprine 5 mg oral tablet: 1 tab(s) orally 3 times a day as needed for Muscle Spasm  ferrous sulfate 325 mg (65 mg elemental iron) oral tablet: 1 tab(s) orally once a day  furosemide 40 mg oral tablet: 1 tab(s) orally once a day  levothyroxine 150 mcg (0.15 mg) oral tablet: 1 tab(s) orally once a day  lidocaine 4% topical film: Apply topically to affected area once a day 12 hours on and 12 hours off  melatonin 3 mg oral tablet: 2 tab(s) orally once a day (at bedtime)  Multiple Vitamins oral tablet: 1 tab(s) orally once a day  pantoprazole 40 mg oral delayed release tablet: 1 tab(s) orally once a day (before a meal)  petrolatum topical ointment: 1 Apply topically to affected area 3 times a day  polyethylene glycol 3350 oral powder for reconstitution: 17 gram(s) orally once a day  senna leaf extract oral tablet: 2 tab(s) orally once a day (at bedtime)  spironolactone 25 mg oral tablet: 1 tab(s) orally once a day  traMADol 50 mg oral tablet: 1 tab(s) orally every 4 hours as needed for Severe Pain (7 - 10) MDD: 300mg  zolpidem 10 mg oral tablet: 1 tab(s) orally once a day (at bedtime) MDD: 10mg   hard copy, drawn during this pregnancy

## 2025-05-06 ENCOUNTER — APPOINTMENT (OUTPATIENT)
Dept: OBGYN | Facility: CLINIC | Age: 40
End: 2025-05-06
Payer: MEDICAID

## 2025-05-06 ENCOUNTER — NON-APPOINTMENT (OUTPATIENT)
Age: 40
End: 2025-05-06

## 2025-05-06 VITALS
SYSTOLIC BLOOD PRESSURE: 109 MMHG | BODY MASS INDEX: 22.44 KG/M2 | DIASTOLIC BLOOD PRESSURE: 68 MMHG | WEIGHT: 139 LBS | HEART RATE: 67 BPM

## 2025-05-06 DIAGNOSIS — N64.4 MASTODYNIA: ICD-10-CM

## 2025-05-06 DIAGNOSIS — Z01.411 ENCOUNTER FOR GYNECOLOGICAL EXAMINATION (GENERAL) (ROUTINE) WITH ABNORMAL FINDINGS: ICD-10-CM

## 2025-05-06 LAB
BILIRUB UR QL STRIP: NORMAL
GLUCOSE UR-MCNC: NORMAL
HCG UR QL: 0.2 EU/DL
HCG UR QL: NEGATIVE
HGB UR QL STRIP.AUTO: NORMAL
KETONES UR-MCNC: NORMAL
LEUKOCYTE ESTERASE UR QL STRIP: NORMAL
NITRITE UR QL STRIP: NORMAL
PH UR STRIP: 7
PROT UR STRIP-MCNC: NORMAL
SP GR UR STRIP: 1.02

## 2025-05-06 PROCEDURE — 81003 URINALYSIS AUTO W/O SCOPE: CPT | Mod: QW

## 2025-05-06 PROCEDURE — 99459 PELVIC EXAMINATION: CPT

## 2025-05-06 PROCEDURE — 99213 OFFICE O/P EST LOW 20 MIN: CPT

## 2025-05-06 PROCEDURE — 81025 URINE PREGNANCY TEST: CPT

## 2025-05-19 ENCOUNTER — APPOINTMENT (OUTPATIENT)
Dept: BREAST CENTER | Facility: CLINIC | Age: 40
End: 2025-05-19
Payer: MEDICAID

## 2025-05-19 VITALS
HEART RATE: 71 BPM | DIASTOLIC BLOOD PRESSURE: 69 MMHG | HEIGHT: 66 IN | BODY MASS INDEX: 22.34 KG/M2 | SYSTOLIC BLOOD PRESSURE: 110 MMHG | WEIGHT: 139 LBS

## 2025-05-19 DIAGNOSIS — Z78.9 OTHER SPECIFIED HEALTH STATUS: ICD-10-CM

## 2025-05-19 DIAGNOSIS — Z80.0 FAMILY HISTORY OF MALIGNANT NEOPLASM OF DIGESTIVE ORGANS: ICD-10-CM

## 2025-05-19 DIAGNOSIS — Z12.39 ENCOUNTER FOR OTHER SCREENING FOR MALIGNANT NEOPLASM OF BREAST: ICD-10-CM

## 2025-05-19 DIAGNOSIS — N64.4 MASTODYNIA: ICD-10-CM

## 2025-05-19 DIAGNOSIS — R92.30 DENSE BREASTS, UNSPECIFIED: ICD-10-CM

## 2025-05-19 PROCEDURE — 99204 OFFICE O/P NEW MOD 45 MIN: CPT

## 2025-05-19 RX ORDER — OMEGA-3/DHA/EPA/FISH OIL 300-1000MG
CAPSULE ORAL
Refills: 0 | Status: ACTIVE | COMMUNITY

## 2025-05-19 RX ORDER — CETIRIZINE HCL 10 MG
TABLET ORAL
Refills: 0 | Status: ACTIVE | COMMUNITY

## 2025-06-22 ENCOUNTER — NON-APPOINTMENT (OUTPATIENT)
Age: 40
End: 2025-06-22

## 2025-09-01 ENCOUNTER — NON-APPOINTMENT (OUTPATIENT)
Age: 40
End: 2025-09-01

## 2025-09-17 ENCOUNTER — APPOINTMENT (OUTPATIENT)
Dept: ORTHOPEDIC SURGERY | Facility: CLINIC | Age: 40
End: 2025-09-17
Payer: MEDICAID

## 2025-09-17 ENCOUNTER — APPOINTMENT (OUTPATIENT)
Dept: OBGYN | Facility: CLINIC | Age: 40
End: 2025-09-17
Payer: MEDICAID

## 2025-09-17 VITALS
HEIGHT: 66 IN | SYSTOLIC BLOOD PRESSURE: 106 MMHG | HEART RATE: 66 BPM | BODY MASS INDEX: 22.98 KG/M2 | DIASTOLIC BLOOD PRESSURE: 66 MMHG | WEIGHT: 143 LBS

## 2025-09-17 DIAGNOSIS — N97.9 FEMALE INFERTILITY, UNSPECIFIED: ICD-10-CM

## 2025-09-17 DIAGNOSIS — G56.22 LESION OF ULNAR NERVE, LEFT UPPER LIMB: ICD-10-CM

## 2025-09-17 PROCEDURE — 81003 URINALYSIS AUTO W/O SCOPE: CPT | Mod: QW

## 2025-09-17 PROCEDURE — 99213 OFFICE O/P EST LOW 20 MIN: CPT | Mod: 25

## 2025-09-17 PROCEDURE — 76830 TRANSVAGINAL US NON-OB: CPT

## 2025-09-17 PROCEDURE — 99204 OFFICE O/P NEW MOD 45 MIN: CPT

## 2025-09-17 PROCEDURE — 81025 URINE PREGNANCY TEST: CPT

## 2025-09-17 PROCEDURE — 99396 PREV VISIT EST AGE 40-64: CPT

## 2025-09-17 PROCEDURE — 99459 PELVIC EXAMINATION: CPT

## 2025-09-17 RX ORDER — CLOMIPHENE CITRATE 50 MG/1
50 TABLET ORAL DAILY
Qty: 5 | Refills: 2 | Status: ACTIVE | COMMUNITY
Start: 2025-09-17 | End: 1900-01-01

## 2025-09-18 LAB
ANTI-MUELLERIAN HORMONE: 2.87 NG/ML
PROLACTIN SERPL-MCNC: 8.2 NG/ML
